# Patient Record
Sex: MALE | Race: BLACK OR AFRICAN AMERICAN | RURAL
[De-identification: names, ages, dates, MRNs, and addresses within clinical notes are randomized per-mention and may not be internally consistent; named-entity substitution may affect disease eponyms.]

---

## 2018-01-11 ENCOUNTER — OFFICE VISIT (OUTPATIENT)
Dept: INTERNAL MEDICINE CLINIC | Age: 23
End: 2018-01-11

## 2018-01-11 VITALS
WEIGHT: 152.8 LBS | TEMPERATURE: 97.3 F | DIASTOLIC BLOOD PRESSURE: 67 MMHG | BODY MASS INDEX: 23.16 KG/M2 | HEIGHT: 68 IN | RESPIRATION RATE: 18 BRPM | HEART RATE: 68 BPM | SYSTOLIC BLOOD PRESSURE: 126 MMHG | OXYGEN SATURATION: 100 %

## 2018-01-11 DIAGNOSIS — G44.52 NEW DAILY PERSISTENT HEADACHE: Primary | ICD-10-CM

## 2018-01-11 DIAGNOSIS — Z20.6 EXPOSURE TO HIV: ICD-10-CM

## 2018-01-11 DIAGNOSIS — Z76.89 ENCOUNTER TO ESTABLISH CARE: ICD-10-CM

## 2018-01-11 NOTE — PROGRESS NOTES
HISTORY OF PRESENT ILLNESS  Moisés Skinner is a 23 y.o. male. HPI  Chief Complaint   Patient presents with    New Patient     ESTABLISH CARE     States during 12/23 had high intake of alcohol. Since then has been having HA. Review of Systems   HENT: Positive for congestion and ear pain. Negative for sore throat. Pain left side  Feels has cold that started 3 weeks ago   Eyes: Negative. Negative for double vision. Respiratory: Negative for cough, shortness of breath and wheezing. Cardiovascular: Negative for chest pain and leg swelling. Gastrointestinal: Negative for abdominal pain, nausea and vomiting. Has BM every other day or 2 days   Genitourinary: Negative for dysuria and hematuria. Musculoskeletal: Negative for back pain and joint pain. Neurological: Positive for headaches. Negative for dizziness, tingling and tremors. States LEE develops every day after moving around. Has HA today 3/10  Patient call LEE an irritation that he describes as pressure, achy. States with exertion it can get to 4/10. Physical Exam   Constitutional: He is oriented to person, place, and time. He appears well-developed and well-nourished. Cardiovascular: Normal rate, regular rhythm, normal heart sounds and intact distal pulses. Exam reveals no gallop and no friction rub. No murmur heard. Pulmonary/Chest: Effort normal and breath sounds normal. No respiratory distress. He has no wheezes. He has no rales. Neurological: He is alert and oriented to person, place, and time. Skin: Skin is warm and dry. Nursing note and vitals reviewed. Plan of care and AVS reviewed with patient who verbalized understanding. ASSESSMENT and PLAN    ICD-10-CM ICD-9-CM    1. New daily persistent headache G44.52 339.42 CBC WITH AUTOMATED DIFF      METABOLIC PANEL, COMPREHENSIVE      TSH 3RD GENERATION   2.  Encounter to establish care Z76.89 V65.8 [place] HANDLG&/OR CONVEY OF SPEC FOR TR OFFICE TO LAB      [place] COLLECTION VENOUS BLOOD,VENIPUNCTURE   3. Exposure to HIV Z20.6 V01.79 HIV-1 RNA QL      [place] HANDLG&/OR CONVEY OF SPEC FOR TR OFFICE TO LAB      UT COLLECTION VENOUS BLOOD,VENIPUNCTURE   F/U physical exam and lab review.

## 2018-01-11 NOTE — PROGRESS NOTES
Chief Complaint   Patient presents with    New Patient     [address] Maintenance Due   Topic Date Due    HPV AGE 9Y-34Y (3 of 3 - Male 3 Dose Series) 11/06/2016    Influenza Age 5 to Adult  08/01/2017     Patient refuses flu vaccine.

## 2018-01-11 NOTE — MR AVS SNAPSHOT
Visit Information Date & Time Provider Department Dept. Phone Encounter #  
 1/11/2018 10:00 AM Ender Gaspar, 1 [facility] [phone] [id number] Follow-up Instructions Return in about 1 week (around 1/18/2018) for Physical exam. Upcoming Health Maintenance Date Due  
 HPV AGE 9Y-34Y (3 of 3 - Male 3 Dose Series) 11/6/2016 Influenza Age 5 to Adult 8/1/2017 DTaP/Tdap/Td series (8 - Td) 7/23/2025 Allergies as of 1/11/2018  Review Complete On: 1/11/2018 By: Ender Gaspar NP No Known Allergies Current Immunizations  Reviewed on 1/11/2018 Name Date DTAP Vaccine 2/19/2002, 2/15/1998, 1997, 1997, 1997 HIB Vaccine 2/19/1998, 1997, 1997, 1997 HPV (9-valent) 7/6/2016 HPV (Quad) 7/23/2015 Hep A Vaccine 2 Dose Schedule (Ped/Adol) 4/21/2014 Hepatitis A Vaccine 6/6/2012 Hepatitis B Vaccine 1/13/1998, 1997, 1997 IPV 2/19/2002, 1997, 1997, 1997 MMR Vaccine 2/19/2002, 2/19/1998 Meningococcal (MCV4P) Vaccine 7/23/2015 Meningococcal Vaccine 6/6/2012 TDAP Vaccine 9/2/2009 Tdap 7/23/2015 Varicella Virus Vaccine Live 6/6/2012, 8/14/2000 Reviewed by Ender Gaspar NP on 1/11/2018 at 10:13 AM  
You Were Diagnosed With   
  
 Codes Comments Encounter to establish care    -  Primary ICD-10-CM: Z76.89 
ICD-9-CM: V65.8 New daily persistent headache     ICD-10-CM: G44.52 
ICD-9-CM: 339.42 Exposure to HIV     ICD-10-CM: Z20.6 ICD-9-CM: V01.79 Vitals BP Pulse Temp Resp Height(growth percentile) 126/67 (BP 1 Location: Left arm, BP Patient Position: Sitting) 68 97.3 °F (36.3 °C) (Temporal) 18 5' 7.52\" (1.715 m) Weight(growth percentile) SpO2 BMI Smoking Status 152 lb 12.8 oz (69.3 kg) 100% 23.56 kg/m2 Former Smoker Vitals History BMI and BSA Data  Body Mass Index Body Surface Area  
 23.56 kg/m 2 1.82 m 2  
  
  
 Preferred Pharmacy Pharmacy Name Phone [facility] [address] [facility] [phone] Your Updated Medication List  
  
Notice  As of 1/11/2018 10:43 AM  
 You have not been prescribed any medications. We Performed the Following CBC WITH AUTOMATED DIFF [04412 CPT(R)] HIV-1 RNA QL [id number] CPT(R)] METABOLIC PANEL, COMPREHENSIVE [18100 CPT(R)] TSH 3RD GENERATION [92806 CPT(R)] Follow-up Instructions Return in about 1 week (around 1/18/2018) for Physical exam.  
  
  
Introducing [facility] & HEALTH SERVICES! Sherry Alberts introduces [employer] patient portal. Now you can access parts of your medical record, email your doctor's office, and request medication refills online. 1. In your internet browser, go to [email or web address] 2. Click on the First Time User? Click Here link in the Sign In box. You will see the New Member Sign Up page. 3. Enter your Sy[employer]ancia Access Code exactly as it appears below. You will not need to use this code after youve completed the sign-up process. If you do not sign up before the expiration date, you must request a new code. · [employer] Access Code: [email or web address] Expires: 4/11/2018  9:33 AM 
 
4. Enter the last four digits of your Social Security Number (xxxx) and Date of Birth (mm/dd/yyyy) as indicated and click Submit. You will be taken to the next sign-up page. 5. Create a Systancia ID. This will be your [employer] login ID and cannot be changed, so think of one that is secure and easy to remember. 6. Create a [employer]ancia password. You can change your password at any time. 7. Enter your Password Reset Question and Answer. This can be used at a later time if you forget your password. 8. Enter your e-mail address. You will receive e-mail notification when new information is available in [address] 9. Click Sign Up. You can now view and download portions of your medical record. 10. Click the Download Summary menu link to download a portable copy of your medical information. If you have questions, please visit the Frequently Asked Questions section of the Balakam website. Remember, Balakam is NOT to be used for urgent needs. For medical emergencies, dial 911. Now available from your iPhone and Android! Please provide this summary of care documentation to your next provider. Your primary care clinician is listed as Shannen Orosco. If you have any questions after today's visit, please call [phone].

## 2018-01-16 LAB
ALBUMIN SERPL-MCNC: 4.8 G/DL (ref 3.5–5.5)
ALBUMIN/GLOB SERPL: 1.5 {RATIO} (ref 1.2–2.2)
ALP SERPL-CCNC: 69 IU/L (ref 39–117)
ALT SERPL-CCNC: 8 IU/L (ref 0–44)
AST SERPL-CCNC: 21 IU/L (ref 0–40)
BASOPHILS # BLD AUTO: 0 X10E3/UL (ref 0–0.2)
BASOPHILS NFR BLD AUTO: 0 %
BILIRUB SERPL-MCNC: 1.8 MG/DL (ref 0–1.2)
BUN SERPL-MCNC: 17 MG/DL (ref 6–20)
BUN/CREAT SERPL: 18 (ref 9–20)
CALCIUM SERPL-MCNC: 9.6 MG/DL (ref 8.7–10.2)
CHLORIDE SERPL-SCNC: 99 MMOL/L (ref 96–106)
CO2 SERPL-SCNC: 20 MMOL/L (ref 18–29)
CREAT SERPL-MCNC: 0.97 MG/DL (ref 0.76–1.27)
EOSINOPHIL # BLD AUTO: 0.1 X10E3/UL (ref 0–0.4)
EOSINOPHIL NFR BLD AUTO: 2 %
ERYTHROCYTE [DISTWIDTH] IN BLOOD BY AUTOMATED COUNT: 13.3 % (ref 12.3–15.4)
GLOBULIN SER CALC-MCNC: 3.1 G/DL (ref 1.5–4.5)
GLUCOSE SERPL-MCNC: 76 MG/DL (ref 65–99)
HCT VFR BLD AUTO: 44 % (ref 37.5–51)
HGB BLD-MCNC: 14.2 G/DL (ref 13–17.7)
HIV1 RNA SERPL QL NAA+PROBE: NEGATIVE
IMM GRANULOCYTES # BLD: 0 X10E3/UL (ref 0–0.1)
IMM GRANULOCYTES NFR BLD: 0 %
LYMPHOCYTES # BLD AUTO: 1.3 X10E3/UL (ref 0.7–3.1)
LYMPHOCYTES NFR BLD AUTO: 35 %
MCH RBC QN AUTO: 29.8 PG (ref 26.6–33)
MCHC RBC AUTO-ENTMCNC: 32.3 G/DL (ref 31.5–35.7)
MCV RBC AUTO: 92 FL (ref 79–97)
MONOCYTES # BLD AUTO: 0.3 X10E3/UL (ref 0.1–0.9)
MONOCYTES NFR BLD AUTO: 7 %
NEUTROPHILS # BLD AUTO: 2 X10E3/UL (ref 1.4–7)
NEUTROPHILS NFR BLD AUTO: 56 %
PLATELET # BLD AUTO: 243 X10E3/UL (ref 150–379)
POTASSIUM SERPL-SCNC: 4.1 MMOL/L (ref 3.5–5.2)
PROT SERPL-MCNC: 7.9 G/DL (ref 6–8.5)
RBC # BLD AUTO: 4.76 X10E6/UL (ref 4.14–5.8)
SODIUM SERPL-SCNC: 140 MMOL/L (ref 134–144)
TSH SERPL DL<=0.005 MIU/L-ACNC: 1.39 UIU/ML (ref 0.45–4.5)
WBC # BLD AUTO: 3.6 X10E3/UL (ref 3.4–10.8)

## 2018-01-18 ENCOUNTER — OFFICE VISIT (OUTPATIENT)
Dept: INTERNAL MEDICINE CLINIC | Age: 23
End: 2018-01-18

## 2018-01-18 VITALS
WEIGHT: 162.4 LBS | RESPIRATION RATE: 18 BRPM | TEMPERATURE: 97.2 F | HEIGHT: 68 IN | BODY MASS INDEX: 24.61 KG/M2 | HEART RATE: 83 BPM | DIASTOLIC BLOOD PRESSURE: 63 MMHG | OXYGEN SATURATION: 100 % | SYSTOLIC BLOOD PRESSURE: 130 MMHG

## 2018-01-18 DIAGNOSIS — Z00.00 PHYSICAL EXAM: Primary | ICD-10-CM

## 2018-01-18 DIAGNOSIS — R17 INCREASED BILIRUBIN LEVEL: ICD-10-CM

## 2018-01-18 NOTE — PROGRESS NOTES
Chief Complaint   Patient presents with    Complete Physical     1. Have you been to the ER, urgent care clinic since your last visit? Hospitalized since your last visit? No    2. Have you seen or consulted any other health care providers outside of the [facility] since your last visit? Include any pap smears or colon screening. No     There are no preventive care reminders to display for this patient.

## 2018-01-18 NOTE — MR AVS SNAPSHOT
[address] [address] [facility] 
[phone] Patient: Adolfo Villeda MRN: [id number] :1995 Visit Information Date & Time Provider Department Dept. Phone Encounter #  
 2018 10:30 AM [facility] [id number] Follow-up Instructions Return if symptoms worsen or fail to improve. Upcoming Health Maintenance Date Due DTaP/Tdap/Td series (8 - Td) 2025 Allergies as of 2018  Review Complete On: 2018 By: Anish Frausto LPN No Known Allergies Current Immunizations  Reviewed on 2018 Name Date DTAP Vaccine 2002, 2/15/1998, 1997, 1997, 1997 HIB Vaccine 1998, 1997, 1997, 1997 HPV (9-valent) 2016 HPV (Quad) 2015 Hep A Vaccine 2 Dose Schedule (Ped/Adol) 2014 Hepatitis A Vaccine 2012 Hepatitis B Vaccine 1998, 1997, 1997 IPV 2002, 1997, 1997, 1997 MMR Vaccine 2002, 1998 Meningococcal (MCV4P) Vaccine 2015 Meningococcal Vaccine 2012 TDAP Vaccine 2009 Tdap 2015 Varicella Virus Vaccine Live 2012, 2000 Not reviewed this visit You Were Diagnosed With   
  
 Codes Comments Increased bilirubin level    -  Primary ICD-10-CM: E80.6 ICD-9-CM: 277.4 Physical exam     ICD-10-CM: Z00.00 ICD-9-CM: V70.9 Vitals BP Pulse Temp Resp Height(growth percentile) 130/63 (BP 1 Location: Right arm, BP Patient Position: Sitting) 83 97.2 °F (36.2 °C) (Temporal) 18 5' 7.52\" (1.715 m) Weight(growth percentile) SpO2 BMI Smoking Status 162 lb 6.4 oz (73.7 kg) 100% 25.05 kg/m2 Former Smoker Vitals History BMI and BSA Data Body Mass Index Body Surface Area 25.05 kg/m 2 1.87 m 2 Preferred Pharmacy Pharmacy Name Phone [facility], [address] [facility] [phone] Your Updated Medication List  
  
Notice  As of 1/18/2018 11:17 AM  
 You have not been prescribed any medications. We Performed the Following BILIRUBIN, TOTAL [id number] CPT(R)] Follow-up Instructions Return if symptoms worsen or fail to improve. Introducing [facility] & HEALTH SERVICES! [facility] introduces [employer] patient portal. Now you can access parts of your medical record, email your doctor's office, and request medication refills online. 1. In your internet browser, go to [email or web address] World 2. Click on the First Time User? Click Here link in the Sign In box. You will see the New Member Sign Up page. 3. Enter your Follica Access Code exactly as it appears below. You will not need to use this code after youve completed the sign-up process. If you do not sign up before the expiration date, you must request a new code. · [employer]a Access Code: [email or web address] Expires: 4/11/2018  9:33 AM 
 
4. Enter the last four digits of your Social Security Number (xxxx) and Date of Birth (mm/dd/yyyy) as indicated and click Submit. You will be taken to the next sign-up page. 5. Create a Follica ID. This will be your Follica login ID and cannot be changed, so think of one that is secure and easy to remember. 6. Create a Follica password. You can change your password at any time. 7. Enter your Password Reset Question and Answer. This can be used at a later time if you forget your password. 8. Enter your e-mail address. You will receive e-mail notification when new information is available in [address] 9. Click Sign Up. You can now view and download portions of your medical record. 10. Click the Download Summary menu link to download a portable copy of your medical information.  
 
If you have questions, please visit the Frequently Asked Questions section of the [employer]y. Remember, WowOwowhart is NOT to be used for urgent needs. For medical emergencies, dial 911. Now available from your iPhone and Android! Please provide this summary of care documentation to your next provider. Your primary care clinician is listed as Maxcine Ahumada. If you have any questions after today's visit, please call [phone].

## 2018-01-19 LAB — BILIRUB SERPL-MCNC: 1.1 MG/DL (ref 0–1.2)

## 2018-01-22 ENCOUNTER — TELEPHONE (OUTPATIENT)
Dept: INTERNAL MEDICINE CLINIC | Age: 23
End: 2018-01-22

## 2018-01-24 NOTE — PROGRESS NOTES
HISTORY OF PRESENT ILLNESS  Cong Antunez is a 26 y.o. male. HPI  Chief Complaint   Patient presents with    Complete Physical     States since increasing water intake BM and urination much better. States still having irritability at occipital and partial lobes. Lab Results   Component Value Date/Time    WBC 3.6 01/11/2018 10:52 AM    Hemoglobin (POC) 14.1 07/23/2015 11:33 AM    HGB 14.2 01/11/2018 10:52 AM    HCT 44.0 01/11/2018 10:52 AM    PLATELET 568 85/82/5492 10:52 AM       Lab Results   Component Value Date/Time    ALT (SGPT) 8 01/11/2018 10:52 AM    AST (SGOT) 21 01/11/2018 10:52 AM    Creatinine 0.97 01/11/2018 10:52 AM    BUN 17 01/11/2018 10:52 AM    Sodium 140 01/11/2018 10:52 AM    Potassium 4.1 01/11/2018 10:52 AM       Lab Results   Component Value Date/Time    Cholesterol, total 134 07/23/2015 10:59 AM    HDL Cholesterol 62 07/23/2015 10:59 AM    LDL, calculated 65 07/23/2015 10:59 AM    Triglyceride 35 07/23/2015 10:59 AM    TSH 1.390 01/11/2018 10:52 AM     Bilirubin 1.8 and will recheck today since hydration. Review of Systems   Constitutional: Negative for chills, fever and weight loss. HENT: Negative. Negative for congestion, ear pain and sore throat. Respiratory: Negative. Negative for cough, shortness of breath and wheezing. Cardiovascular: Negative. Negative for chest pain and leg swelling. Gastrointestinal: Negative for abdominal pain, diarrhea, nausea and vomiting. Genitourinary: Negative. Skin: Negative. Neurological: Positive for headaches. Negative for dizziness. Physical Exam   Constitutional: He is oriented to person, place, and time. He appears well-developed and well-nourished. No distress. HENT:   Head: Normocephalic and atraumatic. Eyes: Conjunctivae are normal.   Neck: Normal range of motion. Neck supple. No thyromegaly present. Cardiovascular: Normal rate, regular rhythm and intact distal pulses.   Exam reveals no gallop and no friction rub.    No murmur heard. Pulmonary/Chest: Effort normal and breath sounds normal. No respiratory distress. He has no wheezes. He has no rales. Abdominal: Soft. Bowel sounds are normal. He exhibits no distension and no mass. There is no tenderness. There is no rebound and no guarding. Musculoskeletal: Normal range of motion. Lymphadenopathy:     He has no cervical adenopathy. Neurological: He is alert and oriented to person, place, and time. Skin: Skin is warm and dry. No rash noted. He is not diaphoretic. No erythema. Positive for enlarged lymph nodes B femoral  Patient states these are not new but decreased since weight loss. Nursing note and vitals reviewed. Plan of care and AVS reviewed with patient who verbalized understanding. ASSESSMENT and PLAN    ICD-10-CM ICD-9-CM    1. Physical exam Z00.00 V70.9    2. Increased bilirubin level E80.6 277.4 BILIRUBIN, TOTAL      OR HANDLG&/OR CONVEY OF SPEC FOR TR OFFICE TO LAB      OR COLLECTION VENOUS BLOOD,VENIPUNCTURE   Will notify patient of lab results. Patient has decided to forego neurology referral or CT scan. F/U prn.

## 2018-03-28 ENCOUNTER — OFFICE VISIT (OUTPATIENT)
Dept: INTERNAL MEDICINE CLINIC | Age: 23
End: 2018-03-28

## 2018-03-28 VITALS
BODY MASS INDEX: 24.61 KG/M2 | HEIGHT: 68 IN | DIASTOLIC BLOOD PRESSURE: 67 MMHG | WEIGHT: 162.4 LBS | RESPIRATION RATE: 20 BRPM | HEART RATE: 67 BPM | OXYGEN SATURATION: 100 % | TEMPERATURE: 97.2 F | SYSTOLIC BLOOD PRESSURE: 126 MMHG

## 2018-03-28 DIAGNOSIS — J02.9 SORE THROAT: ICD-10-CM

## 2018-03-28 DIAGNOSIS — R09.82 POSTNASAL DRIP: ICD-10-CM

## 2018-03-28 DIAGNOSIS — G44.52 NEW DAILY PERSISTENT HEADACHE: Primary | ICD-10-CM

## 2018-03-28 LAB
S PYO AG THROAT QL: NEGATIVE
VALID INTERNAL CONTROL?: YES

## 2018-03-28 RX ORDER — LORATADINE 10 MG/1
10 TABLET ORAL DAILY
COMMUNITY
Start: 2018-03-28 | End: 2019-10-22 | Stop reason: ALTCHOICE

## 2018-03-28 NOTE — PROGRESS NOTES
Chief Complaint   Patient presents with    Facial Pain     HAS COMPLAINTS OF FLINCHING UNDER LEFT EYE, BUT DENIES VISION ISSUES AND NOT DAILY. 1. Have you been to the ER, urgent care clinic since your last visit? Hospitalized since your last visit? No    2. Have you seen or consulted any other health care providers outside of the [address] [place] [address] since your last visit? Include any pap smears or colon screening. No     There are no preventive care reminders to display for this patient.

## 2018-03-28 NOTE — MR AVS SNAPSHOT
Lavaun Jeans 
 
 
 [address] [place] [address]. .o. [place] [address] [facility] 
[phone] Patient: Sweta Forde MRN: [id number] :1995 Visit Information Date & Time Provider Department Dept. Phone Encounter #  
 3/28/2018  7:30 AM Emily Gifford NP [facility] [phone] [id number] Upcoming Health Maintenance Date Due DTaP/Tdap/Td series (8 - Td) 2025 Allergies as of 3/28/2018  Review Complete On: 3/28/2018 By: Emliy Gifford NP No Known Allergies Current Immunizations  Reviewed on 2018 Name Date DTAP Vaccine 2002, 2/15/1998, 1997, 1997, 1997 HIB Vaccine 1998, 1997, 1997, 1997 HPV (9-valent) 2016 HPV (Quad) 2015 Hep A Vaccine 2 Dose Schedule (Ped/Adol) 2014 Hepatitis A Vaccine 2012 Hepatitis B Vaccine 1998, 1997, 1997 IPV 2002, 1997, 1997, 1997 MMR Vaccine 2002, 1998 Meningococcal (MCV4P) Vaccine 2015 Meningococcal Vaccine 2012 TDAP Vaccine 2009 Tdap 2015 Varicella Virus Vaccine Live 2012, 2000 Not reviewed this visit You Were Diagnosed With   
  
 Codes Comments New daily persistent headache    -  Primary ICD-10-CM: G44.52 
ICD-9-CM: 339.42 Postnasal drip     ICD-10-CM: R09.82 ICD-9-CM: 784.91 Sore throat     ICD-10-CM: J02.9 ICD-9-CM: 163 Vitals BP Pulse Temp Resp Height(growth percentile) 126/67 (BP 1 Location: Left arm, BP Patient Position: Sitting) 67 97.2 °F (36.2 °C) (Temporal) 20 5' 7.52\" (1.715 m) Weight(growth percentile) SpO2 BMI Smoking Status 162 lb 6.4 oz (73.7 kg) 100% 25.05 kg/m2 Former Smoker Vitals History BMI and BSA Data Body Mass Index Body Surface Area 25.05 kg/m 2 1.87 m 2 Preferred Pharmacy Pharmacy Name Phone [facility], [address] [facility] [phone] Your Updated Medication List  
  
   
This list is accurate as of 3/28/18  8:09 AM.  Always use your most recent med list.  
  
  
  
  
 loratadine 10 mg tablet Commonly known as:  Virgilio Friday Take 1 Tab by mouth daily. We Performed the Following AMB POC RAPID STREP A [89706 CPT(R)] To-Do List   
 03/28/2018 Imaging:  CT HEAD WO CONT Introducing [facility] & HEALTH SERVICES! Venu Hall introduces [employer] patient portal. Now you can access parts of your medical record, email your doctor's office, and request medication refills online. 1. In your internet browser, go to [email or web address] 2. Click on the First Time User? Click Here link in the Sign In box. You will see the New Member Sign Up page. 3. Enter your G[employer] Access Code exactly as it appears below. You will not need to use this code after youve completed the sign-up process. If you do not sign up before the expiration date, you must request a new code. · [employer] Access Code: [email or web address] Expires: 4/11/2018 10:33 AM 
 
4. Enter the last four digits of your Social Security Number (xxxx) and Date of Birth (mm/dd/yyyy) as indicated and click Submit. You will be taken to the next sign-up page. 5. Create a G[employer] Tech ID. This will be your G[employer] login ID and cannot be changed, so think of one that is secure and easy to remember. 6. Create a G[employer] password. You can change your password at any time. 7. Enter your Password Reset Question and Answer. This can be used at a later time if you forget your password. 8. Enter your e-mail address. You will receive e-mail notification when new information is available in [address] 9. Click Sign Up. You can now view and download portions of your medical record. 10. Click the Download Summary menu link to download a portable copy of your medical information. If you have questions, please visit the Frequently Asked Questions section of the Sustainable Real Estate Solutionst website. Remember, WebVet is NOT to be used for urgent needs. For medical emergencies, dial 911. Now available from your iPhone and Android! Please provide this summary of care documentation to your next provider. Your primary care clinician is listed as Shannen Orosco. If you have any questions after today's visit, please call [phone].

## 2018-03-28 NOTE — PROGRESS NOTES
HISTORY OF PRESENT ILLNESS  Sweta Forde is a 26 y.o. male. HPI  Chief Complaint   Patient presents with    Facial Pain     HAS COMPLAINTS OF FLINCHING UNDER LEFT EYE, BUT DENIES VISION ISSUES AND NOT DAILY.  has been having SX x 2 weeks.  took tylenol to help with SX. States that these SX are a continuation of HA and irritation that occurred in January with first appointment after intoxication of alcohol.  reds a lot on computer about his SX and worrys. Labs reviewed with patient and reassurance given. Patient states is interested in having CT scan. Rapid Strep negative    Review of Systems   HENT: Positive for congestion, ear pain, sinus pain and sore throat. Eyes: Negative. Respiratory: Negative. Cardiovascular: Negative. Gastrointestinal: Negative. Genitourinary: Negative. Musculoskeletal:        C/O flinching under right eye   Psychiatric/Behavioral:        Admits to worrying a lot about self and health. Physical Exam   Constitutional: He is oriented to person, place, and time. He appears well-developed and well-nourished. No distress. HENT:   Head: Normocephalic and atraumatic. Positive for postnasal drip. Eyes: Conjunctivae are normal.   Cardiovascular: Normal rate, regular rhythm, normal heart sounds and intact distal pulses. Exam reveals no gallop and no friction rub. No murmur heard. Pulmonary/Chest: Effort normal and breath sounds normal. No respiratory distress. He has no wheezes. He has no rales. Abdominal: Soft. Bowel sounds are normal. He exhibits no distension. Musculoskeletal: Normal range of motion. No flinchingunder eye noted   Neurological: He is alert and oriented to person, place, and time. Skin: Skin is warm and dry. He is not diaphoretic. Nursing note and vitals reviewed. Plan of care and AVS reviewed with patient who verbalized understanding.     ASSESSMENT and PLAN    ICD-10-CM ICD-9-CM    1. New daily persistent headache G44.52 339.42 CT HEAD WO CONT   2. Postnasal drip R09.82 784.91 loratadine (CLARITIN) 10 mg tablet   3. Sore throat J02.9 462 AMB POC RAPID STREP A   Started loratadine  [facility] to call patient to schedule appointment. F/U prn.

## 2019-01-02 ENCOUNTER — OFFICE VISIT (OUTPATIENT)
Dept: INTERNAL MEDICINE CLINIC | Age: 24
End: 2019-01-02

## 2019-01-02 VITALS
BODY MASS INDEX: 24.4 KG/M2 | WEIGHT: 161 LBS | DIASTOLIC BLOOD PRESSURE: 74 MMHG | TEMPERATURE: 96.8 F | OXYGEN SATURATION: 100 % | HEART RATE: 62 BPM | SYSTOLIC BLOOD PRESSURE: 132 MMHG | HEIGHT: 68 IN | RESPIRATION RATE: 16 BRPM

## 2019-01-02 DIAGNOSIS — Z02.89 ENCOUNTER FOR PHYSICAL EXAMINATION RELATED TO EMPLOYMENT: Primary | ICD-10-CM

## 2019-01-02 LAB
BILIRUB UR QL STRIP: NEGATIVE
GLUCOSE UR-MCNC: NEGATIVE MG/DL
KETONES P FAST UR STRIP-MCNC: NEGATIVE MG/DL
PH UR STRIP: 5.5 [PH] (ref 4.6–8)
PROT UR QL STRIP: NEGATIVE
SP GR UR STRIP: 1.01 (ref 1–1.03)
UA UROBILINOGEN AMB POC: NORMAL (ref 0.2–1)
URINALYSIS CLARITY POC: CLEAR
URINALYSIS COLOR POC: YELLOW
URINE BLOOD POC: NEGATIVE
URINE LEUKOCYTES POC: NEGATIVE
URINE NITRITES POC: NEGATIVE

## 2019-01-02 NOTE — PROGRESS NOTES
DOT PHYSICAL    Devscott Locks 26 y.o. presents for a DOT physical.  He has the following concerns: none    No dizziness, syncope or fainting, exertional chest pain, excessive shortness of breath, focal weakness, abdominal pain, severe depressed mood, thoughts of suicide, recreational drug abuse, excessive alcohol usage. No excessive sleepiness in the day time falling asleep at the wheel or any motor vehicle accidents. No diabetes. No insulin or narcotic agents. Patient Active Problem List   Diagnosis Code    Right shoulder injury S49. 91XA    New daily persistent headache G44.52     Past Medical History:   Diagnosis Date    Right shoulder injury 2014    Seen at [address] [place] [address] 2014     History reviewed. No pertinent surgical history. Social History     Socioeconomic History    Marital status: SINGLE     Spouse name: Not on file    Number of children: Not on file    Years of education: Not on file    Highest education level: Not on file   Social Needs    Financial resource strain: Not on file    Food insecurity - worry: Not on file    Food insecurity - inability: Not on file    Transportation needs - medical: Not on file   [place] needs - non-medical: Not on file   Occupational History    Not on file   Tobacco Use    Smoking status: Former Smoker     Last attempt to quit: 2017     Years since quittin.0    Smokeless tobacco: Never Used   Substance and Sexual Activity    Alcohol use: Yes     Alcohol/week: 0.0 oz     Comment: Occasional    Drug use: No    Sexual activity: Yes     Partners: Female     Birth control/protection: Condom   Other Topics Concern    Not on file   Social History Narrative    Not on file       Current Outpatient Medications   Medication Sig Dispense Refill    loratadine (CLARITIN) 10 mg tablet Take 1 Tab by mouth daily.            Results for orders placed or performed in visit on 19   [facility] POC URINALYSIS DIP STICK AUTO W/O MICRO   Result Value Ref Range    Color (UA POC) Yellow     Clarity (UA POC) Clear     Glucose (UA POC) Negative Negative    Bilirubin (UA POC) Negative Negative    Ketones (UA POC) Negative Negative    Specific gravity (UA POC) 1.015 1.001 - 1.035    Blood (UA POC) Negative Negative    pH (UA POC) 5.5 4.6 - 8.0    Protein (UA POC) Negative Negative    Urobilinogen (UA POC) 0.2 mg/dL 0.2 - 1    Nitrites (UA POC) Negative Negative    Leukocyte esterase (UA POC) Negative Negative         ROS: 12 point system revived,please see HPI for pertinent positives. OBJECTIVE:     Visit Vitals  /74 (BP 1 Location: Left arm, BP Patient Position: Sitting)   Pulse 62   Temp 96.8 °F (36 °C) (Temporal)   Resp 16   Ht 5' 7.52\" (1.715 m)   Wt 161 lb (73 kg)   SpO2 100%   BMI 24.83 kg/m²       Vision meet standards and hearing test good. No exam data present       Physical Examination:    General appearance - alert, well appearing, and in no distress. HEENT  PERRLA, EOMI, Sclera clear, anicteric, Oropharynx clear, no lesions. Chest - RRR S1, S2 heard, no peripheral edema. Lungs- Clear to auscultation, no wheezes, rales or rhonchi, has symmetric air entry    Neck- Supple, No adenopathy. Neuro- CN 2 to 11 grossly normal, No neuro deficits              DTR 2/4, strength on upper/lower ext 5/5 manny    Abdomen/groin- ND,NT, No hernia    ROM: Good ROM of upper and lower extremities. Feet normal      Psy- Mood and Affect WNL      ASSESSMENT/PLAN       ICD-10-CM ICD-9-CM    1. Encounter for physical examination related to employment Z02.89 V70.5 AMB POC URINALYSIS DIP STICK AUTO W/O MICRO         Healthy appearing person. Advised routine care with PCP. 2 yr DOT card with no restrictions given. DOT papers kept in file and [facility] web site updated. I have discussed the diagnosis with the patient and the intended plan as seen in the above orders.   The patient has received an after-visit summary and questions were answered concerning future plans. I have discussed medication side effects and warnings with the patient as well. Pt verbalizes understanding.

## 2019-01-02 NOTE — PROGRESS NOTES
Chief Complaint   Patient presents with    Physical     DOT     I have reviewed the patient's medical history in detail and updated the computerized patient record. Health Maintenance reviewed. 1. Have you been to the ER, urgent care clinic since your last visit? Hospitalized since your last visit?no    2. Have you seen or consulted any other health care providers outside of the [address] [place] [address] [place] since your last visit? Include any pap smears or colon screening. No      Encouraged pt to discuss pt's wishes with spouse/partner/family and bring them in the next appt to follow thru with the Advanced Directive    Fall Risk Assessment, last 12 mths 1/2/2019   Able to walk? Yes   Fall in past 12 months? No       PHQ over the last two weeks 1/2/2019   Little interest or pleasure in doing things Not at all   Feeling down, depressed, irritable, or hopeless Not at all   Total Score PHQ 2 0       Abuse Screening Questionnaire 1/2/2019   Do you ever feel afraid of your partner? N   Are you in a relationship with someone who physically or mentally threatens you? N   Is it safe for you to go home?  Y       ADL Assessment 1/2/2019   Feeding yourself No Help Needed   Getting from bed to chair No Help Needed   Getting dressed No Help Needed   Bathing or showering No Help Needed   Walk across the room (includes cane/walker) No Help Needed   Using the telphone No Help Needed   Taking your medications No Help Needed   Preparing meals No Help Needed   Managing money (expenses/bills) No Help Needed   Moderately strenuous housework (laundry) No Help Needed   Shopping for personal items (toiletries/medicines) No Help Needed   Shopping for groceries No Help Needed   Driving No Help Needed   Climbing a flight of stairs No Help Needed   Getting to places beyond walking distances No Help Needed

## 2019-10-22 ENCOUNTER — OFFICE VISIT (OUTPATIENT)
Dept: INTERNAL MEDICINE CLINIC | Age: 24
End: 2019-10-22

## 2019-10-22 VITALS
HEIGHT: 68 IN | TEMPERATURE: 98.6 F | BODY MASS INDEX: 24.28 KG/M2 | DIASTOLIC BLOOD PRESSURE: 74 MMHG | SYSTOLIC BLOOD PRESSURE: 146 MMHG | HEART RATE: 108 BPM | OXYGEN SATURATION: 96 % | WEIGHT: 160.2 LBS | RESPIRATION RATE: 18 BRPM

## 2019-10-22 DIAGNOSIS — S91.332A PUNCTURE WOUND OF LEFT FOOT, INITIAL ENCOUNTER: Primary | ICD-10-CM

## 2019-10-22 DIAGNOSIS — S81.031A PUNCTURE WOUND OF RIGHT KNEE, INITIAL ENCOUNTER: ICD-10-CM

## 2019-10-22 RX ORDER — CETIRIZINE HCL 10 MG
10 TABLET ORAL DAILY
COMMUNITY
Start: 2018-08-27 | End: 2019-10-22 | Stop reason: ALTCHOICE

## 2019-10-22 RX ORDER — CEPHALEXIN 500 MG/1
500 CAPSULE ORAL 3 TIMES DAILY
Qty: 21 CAP | Refills: 0 | Status: SHIPPED | OUTPATIENT
Start: 2019-10-22 | End: 2019-10-29

## 2019-10-22 RX ORDER — IBUPROFEN 800 MG/1
800 TABLET ORAL
Qty: 20 TAB | Refills: 0 | Status: SHIPPED | OUTPATIENT
Start: 2019-10-22 | End: 2019-10-27

## 2019-10-22 NOTE — PROGRESS NOTES
Subjective:      Chief Complaint   Patient presents with    Knee Injury     [facility] Injury     [address]     He is a 27y.o. year old male who presents for evaluation. Works for [employer]. 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 He later stepped on a beatriz nail with his left foot 30 min later. Last tetanus shot was in 2015. Patient cleaned the site and stopped the bleeding with guaze. Then applied neosporin to his right thigh/ knee area and applied a band aid. Denied any fever or chills, cp, sob, weakness in leg or foot. Complained of soreness at both sites. Reviewed PmHx, RxHx, FmHx, SocHx, AllgHx and updated in chart. Review of Systems   History obtained from the patient  General ROS: negative  Allergy and Immunology ROS: negative  Respiratory ROS: no cough, shortness of breath, or wheezing  Cardiovascular ROS: no chest pain or dyspnea on exertion  Musculoskeletal ROS: negative  positive for - pain in left  foot - right thigh  Neurological ROS: no TIA or stroke symptoms  Dermatological ROS: positive for pain right thigh and left foot.      Objective:     Vitals:    10/22/19 1643   BP: 146/74   Pulse: (!) 108   Resp: 18   Temp: 98.6 °F (37 °C)   TempSrc: Oral   SpO2: 96%   Weight: 160 lb 3.2 oz (72.7 kg)   Height: 5' 7.52\" (1.715 m)     Physical Examination: General appearance - alert, well appearing, and in no distress, oriented to person, place, and time and well hydrated  Mental status - alert, oriented to person, place, and time, normal mood, behavior, speech, dress, motor activity, and thought processes  Chest - clear to auscultation, no wheezes, rales or rhonchi, symmetric air entry  Heart - normal rate, regular rhythm, normal S1, S2, no murmurs, rubs, clicks or gallops, normal rate and regular rhythm, S1 and S2 normal  Neurological - alert, oriented, normal speech, no focal findings or movement disorder noted  Musculoskeletal - no joint tenderness, deformity or swelling, abnormal exam of right leg- right thigh puncture wound, abnormal exam of left foot- puncture wound mid foot  Extremities - peripheral pulses normal, no pedal edema, no clubbing or cyanosis  Skin - puncture wound 0.25 cm dorsal left mid foot- erythema noted but no foreign body palpated. Right inner knee edematous and puncture wound/ bleeding noted. Assessment/ Plan:    consulted and examined pt. ICD-10-CM ICD-9-CM    1. Puncture wound of left foot, initial encounter S91.332A 892.0 cephALEXin (KEFLEX) 500 mg capsule      XR FOOT LT MIN 3 V      ibuprofen (MOTRIN) 800 mg tablet   2. Puncture wound of right knee, initial encounter S81.031A 891.0 cephALEXin (KEFLEX) 500 mg capsule      XR KNEE RT MAX 2 VWS      ibuprofen (MOTRIN) 800 mg tablet     1. Puncture wound of left foot, initial encounter  XRAY and antibiotic ordered. Motrin for pain. - cephALEXin (KEFLEX) 500 mg capsule; Take 1 Cap by mouth three (3) times daily for 7 days. Dispense: 21 Cap; Refill: 0  - XR FOOT LT MIN 3 V; Future  - ibuprofen (MOTRIN) 800 mg tablet; Take 1 Tab by mouth every six (6) hours as needed for Pain for up to 5 days. Take with food. Indications: pain  Dispense: 20 Tab; Refill: 0    2. Puncture wound of right knee, initial encounter  Take as directed. - cephALEXin (KEFLEX) 500 mg capsule; Take 1 Cap by mouth three (3) times daily for 7 days. Dispense: 21 Cap; Refill: 0  - XR KNEE RT MAX 2 VWS; Future  - ibuprofen (MOTRIN) 800 mg tablet; Take 1 Tab by mouth every six (6) hours as needed for Pain for up to 5 days. Indications: pain  Dispense: 20 Tab; Refill: 0    I have discussed the diagnosis with the patient and the intended plan as seen in the above orders. The patient has received an after-visit summary and questions were answered concerning future plans.      Medication Side Effects and Warnings were discussed with patient: yes  Patient Labs were reviewed ( 2018):  yes  Patient Past Records were reviewed:  yes    Note given and stated he should not apply a lot of pressure to his right knee or left foot due to an injury on his job. Can return to full duty on October 28th, 2019. Has [employer] this upcoming weekend. If s/s worsen go to the ER. Follow-up and Dispositions    · Return in about 2 weeks (around 11/5/2019), or if symptoms worsen or fail to improve, for follow up.        Taylor Stafford NP

## 2019-10-22 NOTE — LETTER
NOTIFICATION OF RETURN TO WORK 
 
10/22/2019 5:44 PM 
 
Mr. Ashwin Doss [address] [address] Angi Smith To Whom It May Concern: 
 
Ashwin Doss was under the care of [address]. He will not be able to apply pressure to rt knee or left foot due to an injury on job and to prevent infection. Mr. Kath Johnson can return to regular duties on October 28, 2019 If there are questions or concerns please have the patient contact our office. Sincerely, Kylah Reddy NP

## 2019-10-22 NOTE — LETTER
NOTIFICATION OF RETURN TO WORK  
 
10/22/2019 5:50 PM 
 
Mr. Renzo French [address] [address] Hari Gonzales To Whom It May Concern: 
 
Renzo French was under the care of [address]. He should not apply a lot of pressure to his right knee or left foot due to an injury on his job. This will also help prevent infection. Mr. Bouchra Jones can return to regular duties on October 28, 2019. If there are questions or concerns please have the patient contact our office. Sincerely, Desean Jeronimo NP

## 2019-10-22 NOTE — PROGRESS NOTES
1. Have you been to the ER, urgent care clinic since your last visit? Hospitalized since your last visit? No    2. Have you seen or consulted any other health care providers outside of the [address] [place] [address] since your last visit? Include any pap smears or colon screening.  No    Health Maintenance Due   Topic Date Due    Influenza Age 5 to Adult  08/01/2019

## 2019-10-22 NOTE — PATIENT INSTRUCTIONS
Puncture Wounds: Care Instructions  Your Care Instructions    A puncture wound can happen anywhere on your body. These wounds tend to be narrower and deeper than cuts. A puncture wound is usually left open instead of being closed. This is because a puncture wound can be easily infected, and closing it can make infection even more likely. You will probably have a bandage over the wound. The doctor has checked you carefully, but problems can develop later. If you notice any problems or new symptoms, get medical treatment right away. Follow-up care is a key part of your treatment and safety. Be sure to make and go to all appointments, and call your doctor if you are having problems. It's also a good idea to know your test results and keep a list of the medicines you take. How can you care for yourself at home? · Keep the wound dry for the first 24 to 48 hours. After this, you can shower if your doctor okays it. Pat the wound dry. · Don't soak the wound, such as in a bathtub. Your doctor will tell you when it's safe to get the wound wet. · If your doctor told you how to care for your wound, follow your doctor's instructions. If you did not get instructions, follow this general advice:  ? After the first 24 to 48 hours, wash the wound with clean water 2 times a day. Don't use hydrogen peroxide or alcohol, which can slow healing. ? You may cover the wound with a thin layer of petroleum jelly, such as Vaseline, and a nonstick bandage. ? Apply more petroleum jelly and replace the bandage as needed. · Prop up the sore area on pillows anytime you sit or lie down during the next 3 days. Try to keep it above the level of your heart. This helps reduce swelling. · Avoid any activity that could cause your wound to get worse. · Be safe with medicines. Read and follow all instructions on the label. ? If the doctor gave you a prescription medicine for pain, take it as prescribed.   ? If you are not taking a prescription pain medicine, ask your doctor if you can take an over-the-counter medicine. · If your doctor prescribed antibiotics, take them as directed. Do not stop taking them just because you feel better. You need to take the full course of antibiotics. When should you call for help? Call your doctor now or seek immediate medical care if:    · You have new pain, or your pain gets worse.     · The wound starts to bleed, and blood soaks through the bandage. Oozing small amounts of blood is normal.     · The skin near the wound is cold or pale or changes color.     · You have tingling, weakness, or numbness near the wound.     · You have trouble moving the area near the wound.     · You have symptoms of infection, such as:  ? Increased pain, swelling, warmth, or redness around the wound. ? Red streaks leading from the wound. ? Pus draining from the wound. ? A fever.    Watch closely for changes in your health, and be sure to contact your doctor if:    · The wound is not closing (getting smaller).     · You do not get better as expected. Where can you learn more? Go to http://yin-brando.info/. Enter L003 in the search box to learn more about \"Puncture Wounds: Care Instructions. \"  Current as of: June 26, 2019  Content Version: 12.2  © 4951-6382 [employer]. Care instructions adapted under license by [employer] (which disclaims liability or warranty for this information). If you have questions about a medical condition or this instruction, always ask your healthcare professional. [place] [address] any warranty or liability for your use of this information. Wound Check: Care Instructions  Your Care Instructions  People have wounds that need care for many reasons. You may have a cut that needs care after surgery. You may have a cut or puncture wound from an accident. Or you may have a wound because of a condition like diabetes.   Whatever the cause of your wound, there are things you can do to care for it at home. Your doctor may also want you to come back for a wound check. The wound check lets the doctor know how your wound is healing and if you need more treatment. Follow-up care is a key part of your treatment and safety. Be sure to make and go to all appointments, and call your doctor if you are having problems. It's also a good idea to know your test results and keep a list of the medicines you take. How can you care for yourself at home? · If your doctor told you how to care for your wound, follow your doctor's instructions. If you did not get instructions, follow this general advice:  ? You may cover the wound with a thin layer of petroleum jelly, such as Vaseline, and a nonstick bandage. ? Apply more petroleum jelly and replace the bandage as needed. · Keep the wound dry for the first 24 to 48 hours. After this, you can shower if your doctor okays it. Pat the wound dry. · Be safe with medicines. Read and follow all instructions on the label. ? If the doctor gave you a prescription medicine for pain, take it as prescribed. ? If you are not taking a prescription pain medicine, ask your doctor if you can take an over-the-counter medicine. · If your doctor prescribed antibiotics, take them as directed. Do not stop taking them just because you feel better. You need to take the full course of antibiotics. · If you have stitches, do not remove them on your own. Your doctor will tell you when to come back to have them removed. · If you have Steri-Strips, leave them on until they fall off. · If possible, prop up the injured area on a pillow anytime you sit or lie down during the next 3 days. Try to keep it above the level of your heart. This will help reduce swelling. When should you call for help?   Call your doctor now or seek immediate medical care if:    · You have new pain, or the pain gets worse.     · The skin near the wound is cold or pale or changes color.     · You have tingling, weakness, or numbness near the wound.     · The wound starts to bleed, and blood soaks through the bandage. Oozing small amounts of blood is normal.     · You have symptoms of infection, such as:  ? Increased pain, swelling, warmth, or redness. ? Red streaks leading from the wound. ? Pus draining from the wound. ? A fever.    Watch closely for changes in your health, and be sure to contact your doctor if:    · You do not get better as expected. Where can you learn more? Go to http://yin-brando.info/. Enter P342 in the search box to learn more about \"Wound Check: Care Instructions. \"  Current as of: June 26, 2019  Content Version: 12.2  © 5810-2043 [employer]. Care instructions adapted under license by [employer] (which disclaims liability or warranty for this information). If you have questions about a medical condition or this instruction, always ask your healthcare professional. [place] [address] any warranty or liability for your use of this information. Ibuprofen (By mouth)   Ibuprofen (eye-bue-PROE-fen)  Treats pain and fever. This medicine is an NSAID. Brand Name(s): Advil, Advil Children's, Advil Liqui-Gels, Advil Migraine, All-Purpose First Aid Kit, Children's Ibuprofen, Children's Motrin, Comfort Pac, Concentrated Motrin Infants' Drops, Equate Ibuprofen Julius Strength, Genpril, Good [facility]ighbor Ibuprofen Infants', [facility] Children's Ibuprofen, [facility] Ibuprofen, [facility] Ibuprofen Julius Strength   There may be other brand names for this medicine. When This Medicine Should Not Be Used: This medicine is not right for everyone. Do not use if you had an allergic reaction (including asthma) to ibuprofen, aspirin, or another NSAID, or right before or after heart surgery.   How to Use This Medicine:   Capsule, Liquid Filled Capsule, Suspension, Tablet, Chewable Tablet  · Your doctor will tell you how much medicine to use. Do not use more than directed. · Prescription ibuprofen should come with a Medication Guide. Ask your pharmacist for the Medication Guide if you do not have one. · Follow the instructions on the medicine label if you are using this medicine without a prescription. · Take this medicine with food or milk if it upsets your stomach. · Oral liquid: Shake well just before using. Measure with a marked measuring spoon, oral syringe, or medicine cup. · Chewable tablet: Chew completely before you swallow it. Then drink some water to make sure you swallow all of the medicine. · For Children: Ask your pharmacist if you are not sure how much medicine to give a child. The dose is usually based on weight, not age. Never give more medicine than directed. · For Adults: Do not take more than 6 pills in 1 day (24 hours) unless your doctor tells you to. · Missed dose: If you take this medicine on a regular basis and miss a dose, take it as soon as you can. If it is almost time for your next dose, wait until then to use the medicine and skip the missed dose. Do not use extra medicine to make up for a missed dose. · Store the medicine in a closed container at room temperature, away from heat, moisture, and direct light. Do not freeze the oral liquid. Drugs and Foods to Avoid:   Ask your doctor or pharmacist before using any other medicine, including over-the-counter medicines, vitamins, and herbal products. · Some foods and medicine can affect how ibuprofen works. Tell your doctor if you are also using lithium, methotrexate, a blood thinner (such as warfarin), a steroid medicine (such as hydrocortisone, prednisolone, prednisone), a diuretic (water pill), or an ACE inhibitor blood pressure medicine. · Do not use any other NSAID medicine unless your doctor says it is okay. Some other NSAIDs are aspirin, diclofenac, naproxen, or celecoxib.   · Do not drink alcohol while you are using this medicine. Warnings While Using This Medicine:   · Tell your doctor if you are pregnant or breastfeeding. Do not use this medicine during the later part of pregnancy. · Tell your doctor if you have kidney disease, liver disease, asthma, lupus or a similar connective tissue disease, or a history of ulcers or other digestion problems. Tell your doctor if you smoke or have heart or blood circulation problems, including high blood pressure, heart failure (CHF), or bleeding problems. · This medicine may cause the following problems:  ¨ Bleeding and ulcers in the stomach or intestines  ¨ Higher risk of heart attack or stroke  ¨ Liver damage  ¨ Kidney damage  ¨ Vision problems  · Call your doctor if symptoms get worse, pain lasts more than 10 days, or fever lasts more than 3 days. · This medicine might contain sugar or phenylalanine (aspartame). · Tell any doctor or dentist who treats you that you are using this medicine. · Keep all medicine out of the reach of children. Never share your medicine with anyone.   Possible Side Effects While Using This Medicine:   Call your doctor right away if you notice any of these side effects:  · Allergic reaction: Itching or hives, swelling in your face or hands, swelling or tingling in your mouth or throat, chest tightness, trouble breathing  · Blistering, peeling, or red skin rash  · Change in how much or how often you urinate  · Chest pain that may spread to your arms, jaw, back, or neck, trouble breathing, nausea, unusual sweating, faintness  · Chest pain, trouble breathing, weakness on one side of your body, severe headache, trouble seeing or talking, pain in your lower leg  · Dark urine or pale stools, nausea, vomiting, loss of appetite, stomach pain, yellow skin or eyes  · Fever, neck pain, stiff neck  · Severe stomach pain, vomiting blood, bloody or black, tarry stools  · Swelling in your hands, ankles, or feet, rapid weight gain  · Trouble seeing, blind spots, change in how you see colors  · Unusual bleeding, bruising, or weakness  If you notice these less serious side effects, talk with your doctor:   · Constipation, diarrhea, gas, mild upset stomach  · Dizziness, headache, ringing in the ears  If you notice other side effects that you think are caused by this medicine, tell your doctor. Call your doctor for medical advice about side effects. You may report side effects to FDA at 9-353-GCT-6199  © 2017 [facility] Information is for End User's use only and may not be sold, redistributed or otherwise used for commercial purposes. The above information is an  only. It is not intended as medical advice for individual conditions or treatments. Talk to your doctor, nurse or pharmacist before following any medical regimen to see if it is safe and effective for you. INSTRUCTIONS:  Your tetanus vaccine was given in 2015- Up to date. Get [facility]YS done tomorrow at [facility]. Keep wounds clean and dry. Wash with soap and water when cleansing. Apply bandaids as needed. Take Cephalexin 500mg 3 x day for 7 days to prevent infection  Take Ibuprofen 800mg every 6 hrs as needed for pain. Take with food.

## 2019-10-25 ENCOUNTER — TELEPHONE (OUTPATIENT)
Dept: INTERNAL MEDICINE CLINIC | Age: 24
End: 2019-10-25

## 2019-10-25 NOTE — TELEPHONE ENCOUNTER
Called pt and notified him that his XRAYS done on 10/23 were normal. He said the Ibuprofen 800mg is helping his discomfort. Advised him to continue to take it with food as needed/ directed.

## 2020-12-15 ENCOUNTER — OFFICE VISIT (OUTPATIENT)
Dept: INTERNAL MEDICINE CLINIC | Age: 25
End: 2020-12-15
Payer: MEDICAID

## 2020-12-15 VITALS
HEIGHT: 68 IN | RESPIRATION RATE: 16 BRPM | OXYGEN SATURATION: 99 % | TEMPERATURE: 97.6 F | BODY MASS INDEX: 24.25 KG/M2 | SYSTOLIC BLOOD PRESSURE: 135 MMHG | HEART RATE: 68 BPM | WEIGHT: 160 LBS | DIASTOLIC BLOOD PRESSURE: 80 MMHG

## 2020-12-15 DIAGNOSIS — Z02.89 ENCOUNTER FOR OCCUPATIONAL PHYSICAL EXAMINATION: Primary | ICD-10-CM

## 2020-12-15 LAB
BILIRUB UR QL STRIP: NEGATIVE
GLUCOSE UR-MCNC: NEGATIVE MG/DL
KETONES P FAST UR STRIP-MCNC: NEGATIVE MG/DL
PH UR STRIP: 6.5 [PH] (ref 4.6–8)
PROT UR QL STRIP: NEGATIVE
SP GR UR STRIP: 1.03 (ref 1–1.03)
UA UROBILINOGEN AMB POC: NORMAL (ref 0.2–1)
URINALYSIS CLARITY POC: CLEAR
URINALYSIS COLOR POC: YELLOW
URINE BLOOD POC: NORMAL
URINE LEUKOCYTES POC: NEGATIVE
URINE NITRITES POC: NEGATIVE

## 2020-12-15 PROCEDURE — 81003 URINALYSIS AUTO W/O SCOPE: CPT | Performed by: FAMILY MEDICINE

## 2020-12-15 PROCEDURE — 99213 OFFICE O/P EST LOW 20 MIN: CPT | Performed by: FAMILY MEDICINE

## 2020-12-15 NOTE — PROGRESS NOTES
DOT PHYSICAL    Guerow Dorcas 23 y.o. presents for a DOT physical.  He has the following concerns: none    No dizziness, syncope or fainting, exertional chest pain, excessive shortness of breath, focal weakness, abdominal pain, severe depressed mood, thoughts of suicide, recreational drug abuse, excessive alcohol usage. No excessive sleepiness in the day time falling asleep at the wheel or any motor vehicle accidents. No diabetes. No insulin or narcotic agents. Patient Active Problem List   Diagnosis Code    Right shoulder injury S49. 91XA    New daily persistent headache G44.52     Past Medical History:   Diagnosis Date    Right shoulder injury 8/18/2014    Seen at [address] [place] [address] 8.12.2014     No past surgical history on file. Social History     Socioeconomic History    Marital status: SINGLE     Spouse name: Not on file    Number of children: Not on file    Years of education: Not on file    Highest education level: Not on file   Occupational History    Not on file   Social Needs    Financial resource strain: Not on file    Food insecurity     Worry: Not on file     Inability: Not on file    Transportation needs     Medical: Not on file     Non-medical: Not on file   Tobacco Use    Smoking status: Former Smoker     Last attempt to quit: 12/11/2017     Years since quitting: 3.0    Smokeless tobacco: Never Used   Substance and Sexual Activity    Alcohol use:  Yes     Alcohol/week: 0.0 standard drinks     Comment: Occasional    Drug use: No    Sexual activity: Yes     Partners: Female     Birth control/protection: Condom   Lifestyle    Physical activity     Days per week: Not on file     Minutes per session: Not on file    Stress: Not on file   Relationships    Social connections     Talks on phone: Not on file     Gets together: Not on file     Attends Taoist service: Not on file     Active member of club or organization: Not on file     Attends meetings of clubs or organizations: Not on file     Relationship status: Not on file    Intimate partner violence     Fear of current or ex partner: Not on file     Emotionally abused: Not on file     Physically abused: Not on file     Forced sexual activity: Not on file   Other Topics Concern    Not on file   Social History Narrative    Not on file             Results for orders placed or performed in visit on 12/15/20   AMB POC URINALYSIS DIP STICK AUTO W/O MICRO   Result Value Ref Range    Color (UA POC) Yellow     Clarity (UA POC) Clear     Glucose (UA POC) Negative Negative    Bilirubin (UA POC) Negative Negative    Ketones (UA POC) Negative Negative    Specific gravity (UA POC) 1.030 1.001 - 1.035    Blood (UA POC) Trace Negative    pH (UA POC) 6.5 4.6 - 8.0    Protein (UA POC) Negative Negative    Urobilinogen (UA POC) 0.2 mg/dL 0.2 - 1    Nitrites (UA POC) Negative Negative    Leukocyte esterase (UA POC) Negative Negative         ROS: 12 point system revived,please see HPI for pertinent positives. OBJECTIVE:     Visit Vitals  /80 (BP 1 Location: Left arm, BP Patient Position: Sitting)   Pulse 68   Temp 97.6 °F (36.4 °C) (Temporal)   Resp 16   Ht 5' 8\" (1.727 m)   Wt 160 lb (72.6 kg)   SpO2 99%   BMI 24.33 kg/m²       Vision meet standards and hearing test good. Hearing Screening    125Hz 250Hz 500Hz 1000Hz 2000Hz 3000Hz 4000Hz 6000Hz 8000Hz   Right ear:            Left ear:            Comments: Hearing &gt;5 feet L/R ears     Visual Acuity Screening    Right eye Left eye Both eyes   Without correction: 20/20 20/20    With correction:             Physical Examination:    General appearance - alert, well appearing, and in no distress. HEENT  PERRLA, EOMI, Sclera clear, anicteric, Oropharynx clear, no lesions. Chest - RRR S1, S2 heard, no peripheral edema. Lungs- Clear to auscultation, no wheezes, rales or rhonchi, has symmetric air entry    Neck- Supple, No adenopathy.     Neuro- CN 2 to 11 grossly normal, No neuro deficits              DTR 2/4, strength on upper/lower ext 5/5 manny    Abdomen/groin- ND,NT, No hernia    ROM: Good ROM of upper and lower extremities. Feet normal      Psy- Mood and Affect WNL      ASSESSMENT/PLAN         Healthy appearing person. Advised routine care with PCP. 2 yr DOT card with no restrictions given. DOT papers kept in file and [facility] web site updated. I have discussed the diagnosis with the patient and the intended plan as seen in the above orders. The patient has received an after-visit summary and questions were answered concerning future plans. I have discussed medication side effects and warnings with the patient as well. Pt verbalizes understanding.

## 2020-12-15 NOTE — PROGRESS NOTES
Chief Complaint   Patient presents with    Physical     DOT     Health Maintenance reviewed. I have reviewed the patient's medical history in detail and updated the computerized patient record. Patient has not been out of the country in (8-9 months), NO diarrhea, NO cough, NO chest conjestion, NO temp. Pt has not been around anyone with these symptoms. 1. Have you been to the ER, urgent care clinic since your last visit? Hospitalized since your last visit?no    2. Have you seen or consulted any other health care providers outside of the [address] [place] [address] since your last visit? Include any pap smears or colon screening. No      Encouraged pt to discuss pt's wishes with spouse/partner/family and bring them in the next appt to follow thru with the Advanced Directive    Fall Risk Assessment, last 12 mths 12/15/2020   Able to walk? Yes   Fall in past 12 months? No       3 most recent PHQ Screens 12/15/2020   Little interest or pleasure in doing things Several days   Feeling down, depressed, irritable, or hopeless Several days   Total Score PHQ 2 2       Abuse Screening Questionnaire 12/15/2020   Do you ever feel afraid of your partner? N   Are you in a relationship with someone who physically or mentally threatens you? N   Is it safe for you to go home?  Y       ADL Assessment 12/15/2020   Feeding yourself No Help Needed   Getting from bed to chair No Help Needed   Getting dressed No Help Needed   Bathing or showering No Help Needed   Walk across the room (includes cane/walker) No Help Needed   Using the telphone No Help Needed   Taking your medications No Help Needed   Preparing meals No Help Needed   Managing money (expenses/bills) No Help Needed   Moderately strenuous housework (laundry) No Help Needed   Shopping for personal items (toiletries/medicines) No Help Needed   Shopping for groceries No Help Needed   Driving No Help Needed   Climbing a flight of stairs No Help Needed   Getting to places beyond walking distances No Help Needed

## 2022-03-20 PROBLEM — G44.52 NEW DAILY PERSISTENT HEADACHE: Status: ACTIVE | Noted: 2018-03-28

## 2022-10-05 ENCOUNTER — OFFICE VISIT (OUTPATIENT)
Dept: INTERNAL MEDICINE CLINIC | Age: 27
End: 2022-10-05
Payer: OTHER GOVERNMENT

## 2022-10-05 VITALS
BODY MASS INDEX: 23.79 KG/M2 | HEIGHT: 68 IN | TEMPERATURE: 98.3 F | RESPIRATION RATE: 16 BRPM | WEIGHT: 157 LBS | DIASTOLIC BLOOD PRESSURE: 60 MMHG | OXYGEN SATURATION: 98 % | HEART RATE: 61 BPM | SYSTOLIC BLOOD PRESSURE: 106 MMHG

## 2022-10-05 DIAGNOSIS — Z11.6 MALARIA SCREENING: ICD-10-CM

## 2022-10-05 DIAGNOSIS — N52.9 ERECTILE DYSFUNCTION, UNSPECIFIED ERECTILE DYSFUNCTION TYPE: ICD-10-CM

## 2022-10-05 DIAGNOSIS — H93.13 TINNITUS OF BOTH EARS: Primary | ICD-10-CM

## 2022-10-05 PROBLEM — G44.52 NEW DAILY PERSISTENT HEADACHE: Status: RESOLVED | Noted: 2018-03-28 | Resolved: 2022-10-05

## 2022-10-05 PROCEDURE — 99214 OFFICE O/P EST MOD 30 MIN: CPT | Performed by: FAMILY MEDICINE

## 2022-10-05 RX ORDER — SILDENAFIL 100 MG/1
100 TABLET, FILM COATED ORAL
Qty: 10 TABLET | Refills: 5 | Status: SHIPPED | OUTPATIENT
Start: 2022-10-05

## 2022-10-05 NOTE — PROGRESS NOTES
Chief Complaint   Patient presents with    Establish Care     Patient has not been out of the country in (14 months), NO diarrhea, NO cough, NO chest conjestion, NO temp. Pt has not been around anyone with these symptoms. Health Maintenance reviewed. I have reviewed the patient's medical history in detail and updated the computerized patient record. 1. Have you been to the ER, urgent care clinic since your last visit? No  Hospitalized since your last visit? No     2. Have you seen or consulted any other health care providers outside of the [address] [place] [address] since your last visit? No  Include any pap smears or colon screening. Encouraged pt to discuss pt's wishes with spouse/partner/family and bring them in the next appt to follow thru with the Advanced Directive    @  [address], last 12 mths 12/15/2020   Able to walk? Yes   Fall in past 12 months? No       3 most recent PHQ Screens 10/5/2022   Little interest or pleasure in doing things Several days   Feeling down, depressed, irritable, or hopeless Several days   Total Score PHQ 2 2       Abuse Screening Questionnaire 10/5/2022   Do you ever feel afraid of your partner? N   Are you in a relationship with someone who physically or mentally threatens you? N   Is it safe for you to go home?  Y       ADL Assessment 10/5/2022   Feeding yourself No Help Needed   Getting from bed to chair No Help Needed   Getting dressed No Help Needed   Bathing or showering No Help Needed   Walk across the room (includes cane/walker) No Help Needed   Using the telphone No Help Needed   Taking your medications No Help Needed   Preparing meals No Help Needed   Managing money (expenses/bills) No Help Needed   Moderately strenuous housework (laundry) No Help Needed   Shopping for personal items (toiletries/medicines) No Help Needed   Shopping for groceries No Help Needed   Driving No Help Needed   Climbing a flight of stairs No Help Needed   Getting to places beyond walking distances No Help Needed

## 2022-10-05 NOTE — PROGRESS NOTES
Melene Aschoff (: 1995) is a 24 y.o. male, established patient, here for evaluation of the following chief complaint(s):  Establish Care and Ringing in Ear       ASSESSMENT/PLAN:  Below is the assessment and plan developed based on review of pertinent history, physical exam, labs, studies, and medications. Use white noise masking of tinnitus. Reassurance that symptoms will probably resolve on their own, declined medications. Routine labs ordered    1. Tinnitus of both ears  2. Malaria screening  -     METABOLIC PANEL, COMPREHENSIVE; Future  -     CBC W/O DIFF; Future  3. Erectile dysfunction, unspecified erectile dysfunction type  -     sildenafil citrate (VIAGRA) 100 mg tablet; Take 1 Tablet by mouth daily as needed for Erectile Dysfunction. , Normal, Disp-10 Tablet, R-5      Return in about 2 months (around 2022) for routine follow up. 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 He has had labs and evaluation done in [place]. No results. Wants a more thorough evaluation. Relates some ringing in both ears. Some loud noise exposure. Has had some issues with erectile dysfunction since returning. Minimal complaints of depression.     Review of Systems  As above  No fever, weight loss or coughing    Patient Active Problem List   Diagnosis Code    Tinnitus of both ears H93.13     No medications  No Known Allergies    Social History     Socioeconomic History    Marital status: SINGLE     Spouse name: Not on file    Number of children: Not on file    Years of education: Not on file    Highest education level: Not on file   Occupational History    Not on file   Tobacco Use    Smoking status: Some Days     Types: Cigarettes     Last attempt to quit: 2017     Years since quittin.8     Passive exposure: Past    Smokeless tobacco: Never   Vaping Use    Vaping Use: Never used   Substance and Sexual Activity    Alcohol use: Yes     Alcohol/week: 0.0 standard drinks     Comment: Occasional    Drug use: No    Sexual activity: Yes     Partners: Female     Birth control/protection: Condom   Other Topics Concern    Not on file   Social History Narrative    Not on file     Social Determinants of Health     Financial Resource Strain: Not on file   Food Insecurity: Not on file   Transportation Needs: Not on file   Physical Activity: Sufficiently Active    Days of Exercise per Week: 5 days    Minutes of Exercise per Session: 60 min   Stress: Not on file   Social Connections: Not on file   Intimate Partner Violence: Not At Risk    Fear of Current or Ex-Partner: No    Emotionally Abused: No    Physically Abused: No    Sexually Abused: No   Housing Stability: Not on file       Physical Exam    Visit Vitals  /60 (BP 1 Location: Left arm, BP Patient Position: Sitting, BP Cuff Size: Adult)   Pulse 61   Temp 98.3 °F (36.8 °C) (Temporal)   Resp 16   Ht 5' 8\" (1.727 m)   Wt 157 lb (71.2 kg)   SpO2 98%   BMI 23.87 kg/m²       WDWN NAD  TM clear, throat wnl  Neck no adenopathy  Heart RRR no C/M/R  Lungs CTA  Abdo soft non tender  Ext No redness swelling or edema      An electronic signature was used to authenticate this note.   -- Sherlyn Napoles MD

## 2022-10-06 LAB
ALBUMIN SERPL-MCNC: 5.2 G/DL (ref 4.1–5.2)
ALBUMIN/GLOB SERPL: 1.9 {RATIO} (ref 1.2–2.2)
ALP SERPL-CCNC: 60 IU/L (ref 44–121)
ALT SERPL-CCNC: 14 IU/L (ref 0–44)
AST SERPL-CCNC: 21 IU/L (ref 0–40)
BILIRUB SERPL-MCNC: 0.8 MG/DL (ref 0–1.2)
BUN SERPL-MCNC: 19 MG/DL (ref 6–20)
BUN/CREAT SERPL: 18 (ref 9–20)
CALCIUM SERPL-MCNC: 9.8 MG/DL (ref 8.7–10.2)
CHLORIDE SERPL-SCNC: 101 MMOL/L (ref 96–106)
CO2 SERPL-SCNC: 23 MMOL/L (ref 20–29)
CREAT SERPL-MCNC: 1.07 MG/DL (ref 0.76–1.27)
EGFR: 99 ML/MIN/1.73
ERYTHROCYTE [DISTWIDTH] IN BLOOD BY AUTOMATED COUNT: 12.5 % (ref 11.6–15.4)
GLOBULIN SER CALC-MCNC: 2.8 G/DL (ref 1.5–4.5)
GLUCOSE SERPL-MCNC: 94 MG/DL (ref 70–99)
HCT VFR BLD AUTO: 45.3 % (ref 37.5–51)
HGB BLD-MCNC: 15.1 G/DL (ref 13–17.7)
MCH RBC QN AUTO: 29.9 PG (ref 26.6–33)
MCHC RBC AUTO-ENTMCNC: 33.3 G/DL (ref 31.5–35.7)
MCV RBC AUTO: 90 FL (ref 79–97)
PLATELET # BLD AUTO: 256 X10E3/UL (ref 150–450)
POTASSIUM SERPL-SCNC: 4.1 MMOL/L (ref 3.5–5.2)
PROT SERPL-MCNC: 8 G/DL (ref 6–8.5)
RBC # BLD AUTO: 5.05 X10E6/UL (ref 4.14–5.8)
SODIUM SERPL-SCNC: 140 MMOL/L (ref 134–144)
WBC # BLD AUTO: 4.1 X10E3/UL (ref 3.4–10.8)

## 2022-12-07 ENCOUNTER — DOCUMENTATION ONLY (OUTPATIENT)
Dept: INTERNAL MEDICINE CLINIC | Age: 27
End: 2022-12-07

## 2022-12-07 ENCOUNTER — OFFICE VISIT (OUTPATIENT)
Dept: INTERNAL MEDICINE CLINIC | Age: 27
End: 2022-12-07

## 2022-12-07 VITALS
RESPIRATION RATE: 16 BRPM | SYSTOLIC BLOOD PRESSURE: 110 MMHG | DIASTOLIC BLOOD PRESSURE: 61 MMHG | OXYGEN SATURATION: 98 % | BODY MASS INDEX: 23.49 KG/M2 | HEIGHT: 68 IN | WEIGHT: 155 LBS | TEMPERATURE: 98.6 F | HEART RATE: 67 BPM

## 2022-12-07 DIAGNOSIS — N52.9 ERECTILE DYSFUNCTION, UNSPECIFIED ERECTILE DYSFUNCTION TYPE: ICD-10-CM

## 2022-12-07 DIAGNOSIS — Z02.89 ENCOUNTER FOR OCCUPATIONAL PHYSICAL EXAMINATION: Primary | ICD-10-CM

## 2022-12-07 LAB
BILIRUB UR QL STRIP: NEGATIVE
GLUCOSE UR-MCNC: NEGATIVE MG/DL
KETONES P FAST UR STRIP-MCNC: NEGATIVE MG/DL
PH UR STRIP: 5.5 [PH] (ref 4.6–8)
PROT UR QL STRIP: NEGATIVE
SP GR UR STRIP: 1.02 (ref 1–1.03)
UA UROBILINOGEN AMB POC: NORMAL (ref 0.2–1)
URINALYSIS CLARITY POC: CLEAR
URINALYSIS COLOR POC: YELLOW
URINE BLOOD POC: NEGATIVE
URINE LEUKOCYTES POC: NORMAL
URINE NITRITES POC: NEGATIVE

## 2022-12-07 RX ORDER — SILDENAFIL 100 MG/1
100 TABLET, FILM COATED ORAL
Qty: 10 TABLET | Refills: 5 | Status: SHIPPED | OUTPATIENT
Start: 2022-12-07

## 2022-12-07 NOTE — PROGRESS NOTES
DOT PHYSICAL    Sydelle Corporal 24 y.o. presents for a DOT physical.  He has the following concerns: no c/o    No dizziness, syncope or fainting, exertional chest pain, excessive shortness of breath, focal weakness, abdominal pain, severe depressed mood, thoughts of suicide, recreational drug abuse, excessive alcohol usage. No excessive sleepiness in the day time falling asleep at the wheel or any motor vehicle accidents. No diabetes. No insulin or narcotic agents. Patient Active Problem List   Diagnosis Code    Tinnitus of both ears H93.13     Past Medical History:   Diagnosis Date    Right shoulder injury 2014    Seen at [address] [place] [address] 2014     History reviewed. No pertinent surgical history. Social History     Socioeconomic History    Marital status: SINGLE     Spouse name: Not on file    Number of children: Not on file    Years of education: Not on file    Highest education level: Not on file   Occupational History    Not on file   Tobacco Use    Smoking status: Some Days     Types: Cigarettes     Last attempt to quit: 2017     Years since quittin.9     Passive exposure: Past    Smokeless tobacco: Never   Vaping Use    Vaping Use: Never used   Substance and Sexual Activity    Alcohol use:  Yes     Alcohol/week: 0.0 standard drinks     Comment: Occasional    Drug use: No    Sexual activity: Yes     Partners: Female     Birth control/protection: Condom   Other Topics Concern    Not on file   Social History Narrative    Not on file     Social Determinants of Health     Financial Resource Strain: Not on file   Food Insecurity: Not on file   Transportation Needs: Not on file   Physical Activity: Sufficiently Active    Days of Exercise per Week: 5 days    Minutes of Exercise per Session: 60 min   Stress: Not on file   Social Connections: Not on file   Intimate Partner Violence: Not At Risk    Fear of Current or Ex-Partner: No    Emotionally Abused: No    Physically Abused: No    Sexually Abused: No   Housing Stability: Not on file       Current Outpatient Medications   Medication Sig Dispense Refill    sildenafil citrate (VIAGRA) 100 mg tablet Take 1 Tablet by mouth daily as needed for Erectile Dysfunction. 10 Tablet 5         Results for orders placed or performed in visit on 12/07/22   AMB POC URINALYSIS DIP STICK AUTO W/O MICRO   Result Value Ref Range    Color (UA POC) Yellow     Clarity (UA POC) Clear     Glucose (UA POC) Negative Negative    Bilirubin (UA POC) Negative Negative    Ketones (UA POC) Negative Negative    Specific gravity (UA POC) 1.025 1.001 - 1.035    Blood (UA POC) Negative Negative    pH (UA POC) 5.5 4.6 - 8.0    Protein (UA POC) Negative Negative    Urobilinogen (UA POC) 0.2 mg/dL 0.2 - 1    Nitrites (UA POC) Negative Negative    Leukocyte esterase (UA POC) Trace Negative         ROS: 12 point system revived,please see HPI for pertinent positives. OBJECTIVE:   Visit Vitals  /61 (BP 1 Location: Left arm, BP Patient Position: Sitting, BP Cuff Size: Adult)   Pulse 67   Temp 98.6 °F (37 °C) (Temporal)   Resp 16   Ht 5' 8\" (1.727 m)   Wt 155 lb (70.3 kg)   SpO2 98%   BMI 23.57 kg/m²       Vision meet standards and hearing test good. Vision Screening    Right eye Left eye Both eyes   Without correction 20/20 20/20 20/20   With correction      Hearing Screening - Comments[de-identified] Hearing L/R &gt; 5 feet        Physical Examination:    General appearance - alert, well appearing, and in no distress. HEENT  PERRLA, EOMI, Sclera clear, anicteric, Oropharynx clear, no lesions. Chest - RRR S1, S2 heard, no peripheral edema. Lungs- Clear to auscultation, no wheezes, rales or rhonchi, has symmetric air entry    Neck- Supple, No adenopathy. Neuro- CN 2 to 11 grossly normal, No neuro deficits              DTR 2/4, strength on upper/lower ext 5/5 manny    Abdomen/groin- ND,NT, No hernia    ROM: Good ROM of upper and lower extremities. Feet normal      Psy- Mood and Affect WNL      ASSESSMENT/PLAN       ICD-10-CM ICD-9-CM    1. Encounter for occupational physical examination  Z02.89 V70.5 AMB POC URINALYSIS DIP STICK AUTO W/O MICRO      2. Erectile dysfunction, unspecified erectile dysfunction type  N52.9 607.84 sildenafil citrate (VIAGRA) 100 mg tablet        Orders Placed This Encounter    AMB POC URINALYSIS DIP STICK AUTO W/O MICRO    sildenafil citrate (VIAGRA) 100 mg tablet     Sig: Take 1 Tablet by mouth daily as needed for Erectile Dysfunction. Dispense:  10 Tablet     Refill:  5         Healthy appearing person. Advised routine care with PCP. 2 yr DOT card with no restrictions given. DOT papers kept in file and [facility] web site updated. I have discussed the diagnosis with the patient and the intended plan as seen in the above orders. The patient has received an after-visit summary and questions were answered concerning future plans. I have discussed medication side effects and warnings with the patient as well. Pt verbalizes understanding.     F/up prn

## 2022-12-07 NOTE — PROGRESS NOTES
Key: [id number] - PA Case ID: [id number] - Rx #: [id number]  Outcome  Approved today. [facility] pharmacy called approval,Licha stated he picked up script today with cp pay of $2.46 FOR 6 DAY SUPPLY. XAFAUS:[id number];WATPXC:[id number]; Review Type:Prior Auth; Coverage Start Date:11/07/2022; Coverage End Date:12/31/2099;  Drug  Sildenafil Citrate 100MG tablets  Form   Electronic PA Form (9925 NCPDP)

## 2022-12-07 NOTE — PROGRESS NOTES
Key: [id number] - PA Case ID: [id number] - Rx #: [id number]  Status  Sent to Plan today. Decision will be faxed to office within 24 to 72 hours. Please follow up as needed.   Drug  Sildenafil Citrate 100MG tablets  Form   Electronic PA Form (1315 NCPDP)

## 2023-01-17 DIAGNOSIS — N52.9 ERECTILE DYSFUNCTION, UNSPECIFIED ERECTILE DYSFUNCTION TYPE: ICD-10-CM

## 2023-01-19 RX ORDER — SILDENAFIL 100 MG/1
100 TABLET, FILM COATED ORAL
Qty: 10 TABLET | Refills: 5 | Status: SHIPPED | OUTPATIENT
Start: 2023-01-19

## 2023-02-10 ENCOUNTER — OFFICE VISIT (OUTPATIENT)
Dept: INTERNAL MEDICINE CLINIC | Age: 28
End: 2023-02-10
Payer: OTHER GOVERNMENT

## 2023-02-10 VITALS
OXYGEN SATURATION: 98 % | HEART RATE: 87 BPM | BODY MASS INDEX: 23.19 KG/M2 | TEMPERATURE: 98.6 F | RESPIRATION RATE: 16 BRPM | DIASTOLIC BLOOD PRESSURE: 74 MMHG | SYSTOLIC BLOOD PRESSURE: 125 MMHG | HEIGHT: 68 IN | WEIGHT: 153 LBS

## 2023-02-10 DIAGNOSIS — Z11.3 SCREEN FOR STD (SEXUALLY TRANSMITTED DISEASE): Primary | ICD-10-CM

## 2023-02-10 DIAGNOSIS — F10.10 ETOH ABUSE: ICD-10-CM

## 2023-02-10 NOTE — PROGRESS NOTES
Patient has not been out of the country in (14 months), NO diarrhea, NO cough, NO chest conjestion, NO temp. Pt has not been around anyone with these symptoms. Health Maintenance reviewed. I have reviewed the patient's medical history in detail and updated the computerized patient record. 1. \"Have you been to the ER, urgent care clinic since your last visit? No Hospitalized since your last visit? \" No     2. \"Have you seen or consulted any other health care providers outside of the [address] [place] [address] [place] since your last visit? \"  no    3. For patients aged 39-70: Has the patient had a colonoscopy / FIT/ Cologuard? no      Encouraged pt to discuss pt's wishes with spouse/partner/family and bring them in the next appt to follow thru with the Advanced Directive    @  Fall Risk Assessment, last 12 mths 12/15/2020   Able to walk? Yes   Fall in past 12 months? No       3 most recent PHQ Screens 12/7/2022   Little interest or pleasure in doing things Several days   Feeling down, depressed, irritable, or hopeless Several days   Total Score PHQ 2 2       Abuse Screening Questionnaire 12/7/2022   Do you ever feel afraid of your partner? N   Are you in a relationship with someone who physically or mentally threatens you? N   Is it safe for you to go home?  Y       ADL Assessment 12/7/2022   Feeding yourself No Help Needed   Getting from bed to chair No Help Needed   Getting dressed No Help Needed   Bathing or showering No Help Needed   Walk across the room (includes cane/walker) No Help Needed   Using the telphone No Help Needed   Taking your medications No Help Needed   Preparing meals No Help Needed   Managing money (expenses/bills) No Help Needed   Moderately strenuous housework (laundry) No Help Needed   Shopping for personal items (toiletries/medicines) No Help Needed   Shopping for groceries No Help Needed   Driving No Help Needed   Climbing a flight of stairs No Help Needed   Getting to places beyond walking distances No Help Needed

## 2023-02-10 NOTE — PROGRESS NOTES
Tiesha Chaudhary (: 1995) is a 34 y.o. male is here for evaluation of the following chief complaint(s): Exposure to STD and Depression        Subjective/Objective:   Sammy Katz was seen today for Exposure to STD and Depression  Pt here to be screened for STD since sexual partner was positive. Pt denies any symptoms. Pt also reports having a DUI 2 days ago. Pt has not been to court yet. Pt states I need counseling and therapy. Behavioral Health ordered. Pt reports drinking 6 pk beer a day. Review of Systems   Constitutional: Negative. HENT: Negative. Eyes: Negative. Respiratory: Negative. Cardiovascular: Negative. Gastrointestinal: Negative. Genitourinary: Negative. Musculoskeletal: Negative. Skin: Negative. Neurological: Negative. Endo/Heme/Allergies: Negative. Psychiatric/Behavioral:  Positive for substance abuse. Negative for depression, hallucinations, memory loss and suicidal ideas. The patient is nervous/anxious. The patient does not have insomnia. Physical Exam  Vitals reviewed. Constitutional:       General: He is not in acute distress. Appearance: He is not ill-appearing or toxic-appearing. HENT:      Head: Normocephalic and atraumatic. Right Ear: External ear normal.      Left Ear: External ear normal.      Nose: Nose normal.      Mouth/Throat:      Mouth: Mucous membranes are moist.   Eyes:      Conjunctiva/sclera: Conjunctivae normal.      Pupils: Pupils are equal, round, and reactive to light. Cardiovascular:      Rate and Rhythm: Normal rate and regular rhythm. Pulses: Normal pulses. Heart sounds: Normal heart sounds. Pulmonary:      Effort: Pulmonary effort is normal.      Breath sounds: Normal breath sounds. Abdominal:      General: There is no distension. Palpations: Abdomen is soft. Tenderness: There is no abdominal tenderness. There is no guarding. Musculoskeletal:         General: Normal range of motion. Cervical back: Normal range of motion. Right lower leg: No edema. Left lower leg: No edema. Skin:     General: Skin is warm and dry. Capillary Refill: Capillary refill takes less than 2 seconds. Neurological:      General: No focal deficit present. Mental Status: He is alert and oriented to person, place, and time. Psychiatric:         Attention and Perception: Attention and perception normal.         Mood and Affect: Mood is anxious. Speech: Speech normal.         Behavior: Behavior is agitated. Behavior is cooperative. Thought Content: Thought content normal.         Cognition and Memory: Cognition and memory normal.         Judgment: Judgment is impulsive. /74 (BP 1 Location: Left arm, BP Patient Position: Sitting, BP Cuff Size: Adult)   Pulse 87   Temp 98.6 °F (37 °C) (Temporal)   Resp 16   Ht 5' 8\" (1.727 m)   Wt 153 lb (69.4 kg)   SpO2 98%   BMI 23.26 kg/m²            History reviewed. No pertinent surgical history. Past Medical History:   Diagnosis Date    Right shoulder injury 8/18/2014    Seen at [address] [place] [address] 8.12.2014        Current Outpatient Medications   Medication Instructions    sildenafil citrate (VIAGRA) 100 mg, Oral, DAILY AS NEEDED        Assessment/Plan:     The above diagnosis is a acute on chronic problem. We discussed expected course, resolution, and complications of diagnosis in detail. I advised him to call back or return to office if symptoms worsen/change/persist.        ICD-10-CM ICD-9-CM    1. Screen for STD (sexually transmitted disease)  Z11.3 V74.5 REFERRAL TO BEHAVIORAL HEALTH      HIV 1/2 AG/AB, 4TH GENERATION,W RFLX CONFIRM      RPR      CHLAMYDIA / GC-AMPLIFIED      HIV 1/2 AG/AB, 4TH GENERATION,W RFLX CONFIRM      URINALYSIS W/ RFLX MICROSCOPIC      URINALYSIS W/ RFLX MICROSCOPIC      2. ETOH abuse  F10.10 305.00          Return in about 3 days (around 2/13/2023).     An electronic signature was used to authenticate this note.   -- Kevin Sofia, NP

## 2023-02-12 NOTE — PROGRESS NOTES
Adelso Izaguirre (: 1995) is a 34 y.o. male, established patient, here for evaluation of the following chief complaint(s):  No chief complaint on file. See other note    An electronic signature was used to authenticate this note.   -- Benny Piña MD

## 2023-02-13 ENCOUNTER — OFFICE VISIT (OUTPATIENT)
Dept: INTERNAL MEDICINE CLINIC | Age: 28
End: 2023-02-13
Payer: OTHER GOVERNMENT

## 2023-02-13 VITALS
RESPIRATION RATE: 16 BRPM | HEART RATE: 65 BPM | HEIGHT: 68 IN | TEMPERATURE: 98.5 F | WEIGHT: 153 LBS | DIASTOLIC BLOOD PRESSURE: 60 MMHG | SYSTOLIC BLOOD PRESSURE: 104 MMHG | BODY MASS INDEX: 23.19 KG/M2 | OXYGEN SATURATION: 98 %

## 2023-02-13 DIAGNOSIS — F41.9 ANXIETY: Primary | ICD-10-CM

## 2023-02-13 DIAGNOSIS — F10.11 ALCOHOL ABUSE, IN REMISSION: ICD-10-CM

## 2023-02-13 DIAGNOSIS — Z11.59 ENCOUNTER FOR HEPATITIS C SCREENING TEST FOR LOW RISK PATIENT: ICD-10-CM

## 2023-02-13 PROCEDURE — 99214 OFFICE O/P EST MOD 30 MIN: CPT | Performed by: FAMILY MEDICINE

## 2023-02-13 RX ORDER — CITALOPRAM 10 MG/1
10 TABLET ORAL DAILY
Qty: 30 TABLET | Refills: 2 | Status: SHIPPED | OUTPATIENT
Start: 2023-02-13

## 2023-02-13 NOTE — PROGRESS NOTES
Subjective:     Nicole Gómez is a 34 y.o. male who presents for follow up of   Patient Active Problem List   Diagnosis Code    Tinnitus of both ears H93.13   anxiety    New concerns: started drinking to help with anxiety but got DUI, GF was unfaithful. Marj Ramos has stopped etoh. Still in [employer]  See BINU    Psychiatric ROS:   Reports has never taking psychiatric medications. No complaints of abnormal movements, suicidal ideation or focal weakness. Review of Systems, additional:  Pertinent items are noted in HPI. Patient Active Problem List    Diagnosis Date Noted    Tinnitus of both ears 10/05/2022     No Known Allergies  Past Medical History:   Diagnosis Date    Right shoulder injury 2014    Seen at [address] [place] [address] 2014     Social History     Tobacco Use    Smoking status: Some Days     Types: Cigarettes     Last attempt to quit: 2017     Years since quittin.1     Passive exposure: Past    Smokeless tobacco: Never   Substance Use Topics    Alcohol use: Not Currently     Comment: Occasional                 Objective:     Physical exam significant for the following:     Visit Vitals  /60 (BP 1 Location: Left arm, BP Patient Position: Sitting, BP Cuff Size: Adult)   Pulse 65   Temp 98.5 °F (36.9 °C) (Temporal)   Resp 16   Ht 5' 8\" (1.727 m)   Wt 153 lb (69.4 kg)   SpO2 98%   BMI 23.26 kg/m²       No acute distress  2-12 grossly non focal  Affect a little flat    . Assessment/Plan:     Finish w/up for STIs as below  Disc ETOH usage. ICD-10-CM ICD-9-CM    1. Anxiety  F41.9 300.00 citalopram (CELEXA) 10 mg tablet      2. Encounter for hepatitis C screening test for low risk patient  Z11.59 V73.89 HEP B SURFACE AG      HEPATITIS C AB, RFLX TO QT BY PCR      3. Alcohol abuse, in remission  F10.11 305.03         Discussed possible side affects, precautions, and drug interactions and possible benefits of the medication(s).       Orders Placed This Encounter    HEP B SURFACE AG Standing Status:   Future     Standing Expiration Date:   8/14/2023    HEPATITIS C AB, RFLX TO QT BY PCR     Standing Status:   Future     Standing Expiration Date:   8/13/2023    citalopram (CELEXA) 10 mg tablet     Sig: Take 1 Tablet by mouth daily. Dispense:  30 Tablet     Refill:  2       Take half a tab the first few days then increase as tolerated. Discussed possible side affects, precautions, and drug interactions and possible benefits of the medication(s). Follow-up and Dispositions    Return in about 6 weeks (around 3/27/2023) for routine follow up.

## 2023-02-13 NOTE — PROGRESS NOTES
Chief Complaint   Patient presents with    Labs     Lab results     Patient has not been out of the country in (14 months), NO diarrhea, NO cough, NO chest conjestion, NO temp. Pt has not been around anyone with these symptoms. Health Maintenance reviewed. I have reviewed the patient's medical history in detail and updated the computerized patient record. 1. \"Have you been to the ER, urgent care clinic since your last visit? no Hospitalized since your last visit? \" no    2. \"Have you seen or consulted any other health care providers outside of the [address] [place] [address] since your last visit? \" no     3. For patients aged 39-70: Has the patient had a colonoscopy / FIT/ Cologuard? no    Encouraged pt to discuss pt's wishes with spouse/partner/family and bring them in the next appt to follow thru with the Advanced Directive    @  Fall Risk Assessment, last 12 mths 12/15/2020   Able to walk? Yes   Fall in past 12 months? No       3 most recent PHQ Screens 2/10/2023   Little interest or pleasure in doing things Nearly every day   Feeling down, depressed, irritable, or hopeless Nearly every day   Total Score PHQ 2 6   Trouble falling or staying asleep, or sleeping too much Nearly every day   Feeling tired or having little energy Nearly every day   Poor appetite, weight loss, or overeating More than half the days   Feeling bad about yourself - or that you are a failure or have let yourself or your family down Several days   Trouble concentrating on things such as school, work, reading, or watching TV Nearly every day   Moving or speaking so slowly that other people could have noticed; or the opposite being so fidgety that others notice Not at all   Thoughts of being better off dead, or hurting yourself in some way Not at all   PHQ 9 Score 18       Abuse Screening Questionnaire 2/10/2023   Do you ever feel afraid of your partner?  N   Are you in a relationship with someone who physically or mentally threatens you? N   Is it safe for you to go home?  Y       ADL Assessment 2/10/2023   Feeding yourself No Help Needed   Getting from bed to chair No Help Needed   Getting dressed No Help Needed   Bathing or showering No Help Needed   Walk across the room (includes cane/walker) No Help Needed   Using the telphone No Help Needed   Taking your medications No Help Needed   Preparing meals No Help Needed   Managing money (expenses/bills) No Help Needed   Moderately strenuous housework (laundry) No Help Needed   Shopping for personal items (toiletries/medicines) No Help Needed   Shopping for groceries No Help Needed   Driving No Help Needed   Climbing a flight of stairs No Help Needed   Getting to places beyond walking distances No Help Needed

## 2023-02-14 LAB
HBV SURFACE AG SERPL QL IA: NEGATIVE
HCV AB SERPL QL IA: NORMAL
HCV IGG SERPL QL IA: NON REACTIVE

## 2023-03-26 NOTE — PROGRESS NOTES
Subjective:     Nicole Gómez is a 34 y.o. male who presents for follow up of   Depression. Patient Active Problem List   Diagnosis Code    Tinnitus of both ears H93.13       New concerns: mood  up and down seeing therapist? Bipolar. Not Dx. he is read up on the symptoms though does say that his behavior fits that description. Super hi or low mood wise fatigue. Stretches when I don't need sleep. Easily agitated, not suicidal.  Symptoms no better with citalopram may be a little worse  Minimal complaints of sadness and is no longer drinking any alcohol. No other controlled substance usage. Psychiatric ROS:   Reports taking psychiatric medications with some dry mouth. No complaints of abnormal movements, suicidal ideation or focal weakness. Review of Systems, additional:  Pertinent items are noted in HPI. Patient Active Problem List    Diagnosis Date Noted    Tinnitus of both ears 10/05/2022     No Known Allergies  Past Medical History:   Diagnosis Date    Right shoulder injury 2014    Seen at [address] [place] [address] 8     Social History     Tobacco Use    Smoking status: Some Days     Types: Cigarettes     Last attempt to quit: 2017     Years since quittin.2     Passive exposure: Past    Smokeless tobacco: Never   Substance Use Topics    Alcohol use: Not Currently     Comment: Occasional                Objective:     Physical exam significant for the following:     Visit Vitals  /61 (BP 1 Location: Left arm, BP Patient Position: Sitting, BP Cuff Size: Adult)   Pulse 86   Temp 98.6 °F (37 °C) (Temporal)   Resp 16   Ht 5' 8\" (1.727 m)   Wt 151 lb (68.5 kg)   SpO2 98%   BMI 22.96 kg/m²         No acute distress not visibly agitated  2-12 grossly non focal  Affect normal    .   Assessment/Plan:       ICD-10-CM ICD-9-CM    1. Bipolar 2 disorder (HCC)  F31.81 296.89       2.  Anxiety  F41.9 300.00         Orders Placed This Encounter    divalproex DR (DEPAKOTE) 125 mg tablet Sig: Take 1 Tablet by mouth three (3) times daily. Start 1 tablet daily, usually in the evening, every few days increase as tolerated, mood. Dispense:  90 Tablet     Refill:  2     Discussed possible side affects, precautions, and drug interactions and possible benefits of the medication(s). Stop citalopram    Current Outpatient Medications   Medication Sig Dispense Refill    divalproex DR (DEPAKOTE) 125 mg tablet Take 1 Tablet by mouth three (3) times daily. Start 1 tablet daily, usually in the evening, every few days increase as tolerated, mood. 90 Tablet 2    sildenafil citrate (VIAGRA) 100 mg tablet Take 1 Tablet by mouth daily as needed for Erectile Dysfunction. 10 Tablet 5     F/up 1mo.

## 2023-03-27 ENCOUNTER — OFFICE VISIT (OUTPATIENT)
Dept: INTERNAL MEDICINE CLINIC | Age: 28
End: 2023-03-27
Payer: OTHER GOVERNMENT

## 2023-03-27 VITALS
OXYGEN SATURATION: 98 % | HEIGHT: 68 IN | RESPIRATION RATE: 16 BRPM | DIASTOLIC BLOOD PRESSURE: 61 MMHG | HEART RATE: 86 BPM | SYSTOLIC BLOOD PRESSURE: 113 MMHG | TEMPERATURE: 98.6 F | BODY MASS INDEX: 22.88 KG/M2 | WEIGHT: 151 LBS

## 2023-03-27 DIAGNOSIS — F31.81 BIPOLAR 2 DISORDER (HCC): Primary | ICD-10-CM

## 2023-03-27 DIAGNOSIS — F41.9 ANXIETY: ICD-10-CM

## 2023-03-27 PROCEDURE — 99214 OFFICE O/P EST MOD 30 MIN: CPT | Performed by: FAMILY MEDICINE

## 2023-03-27 RX ORDER — DIVALPROEX SODIUM 125 MG/1
125 TABLET, DELAYED RELEASE ORAL 3 TIMES DAILY
Qty: 90 TABLET | Refills: 2 | Status: SHIPPED | OUTPATIENT
Start: 2023-03-27

## 2023-03-27 NOTE — PROGRESS NOTES
Chief Complaint   Patient presents with    Anxiety     Med evaluation     Patient has not been out of the country in (14 months), NO diarrhea, NO cough, NO chest conjestion, NO temp. Pt has not been around anyone with these symptoms. Health Maintenance reviewed. I have reviewed the patient's medical history in detail and updated the computerized patient record. 1. \"Have you been to the ER, urgent care clinic since your last visit? No  Hospitalized since your last visit? \" no    2. \"Have you seen or consulted any other health care providers outside of the [address] [place] [address] since your last visit? \"  no    3. For patients aged 39-70: Has the patient had a colonoscopy / FIT/ Cologuard? no      If the patient is female:    4. For patients aged 41-77: Has the patient had a mammogram within the past 2 years? no      5. For patients aged 21-65: Has the patient had a pap smear?  no    Encouraged pt to discuss pt's wishes with spouse/partner/family and bring them in the next appt to follow thru with the Advanced Directive    @  Fall Risk Assessment, last 12 mths 12/15/2020   Able to walk? Yes   Fall in past 12 months?  No       3 most recent PHQ Screens 3/27/2023   Little interest or pleasure in doing things Nearly every day   Feeling down, depressed, irritable, or hopeless Nearly every day   Total Score PHQ 2 6   Trouble falling or staying asleep, or sleeping too much Nearly every day   Feeling tired or having little energy Nearly every day   Poor appetite, weight loss, or overeating Several days   Feeling bad about yourself - or that you are a failure or have let yourself or your family down Not at all   Trouble concentrating on things such as school, work, reading, or watching TV Not at all   Moving or speaking so slowly that other people could have noticed; or the opposite being so fidgety that others notice Not at all   Thoughts of being better off dead, or hurting yourself in some way -   PHQ 9 Score - How difficult have these problems made it for you to do your work, take care of your home and get along with others Very difficult       Abuse Screening Questionnaire 2/10/2023   Do you ever feel afraid of your partner? N   Are you in a relationship with someone who physically or mentally threatens you? N   Is it safe for you to go home?  Y       ADL Assessment 3/27/2023   Feeding yourself No Help Needed   Getting from bed to chair No Help Needed   Getting dressed No Help Needed   Bathing or showering No Help Needed   Walk across the room (includes cane/walker) No Help Needed   Using the telphone No Help Needed   Taking your medications No Help Needed   Preparing meals No Help Needed   Managing money (expenses/bills) No Help Needed   Moderately strenuous housework (laundry) No Help Needed   Shopping for personal items (toiletries/medicines) No Help Needed   Shopping for groceries No Help Needed   Driving No Help Needed   Climbing a flight of stairs No Help Needed   Getting to places beyond walking distances No Help Needed

## 2023-05-03 ENCOUNTER — OFFICE VISIT (OUTPATIENT)
Dept: INTERNAL MEDICINE CLINIC | Age: 28
End: 2023-05-03

## 2023-05-03 VITALS
TEMPERATURE: 98.6 F | HEIGHT: 68 IN | HEART RATE: 62 BPM | BODY MASS INDEX: 22.88 KG/M2 | RESPIRATION RATE: 16 BRPM | WEIGHT: 151 LBS | OXYGEN SATURATION: 99 % | SYSTOLIC BLOOD PRESSURE: 105 MMHG | DIASTOLIC BLOOD PRESSURE: 60 MMHG

## 2023-05-03 DIAGNOSIS — F33.1 DEPRESSION, MAJOR, RECURRENT, MODERATE (HCC): ICD-10-CM

## 2023-05-03 DIAGNOSIS — F43.10 PTSD (POST-TRAUMATIC STRESS DISORDER): Primary | ICD-10-CM

## 2023-05-03 DIAGNOSIS — N52.9 ERECTILE DYSFUNCTION, UNSPECIFIED ERECTILE DYSFUNCTION TYPE: ICD-10-CM

## 2023-05-03 RX ORDER — FLUOXETINE 20 MG/1
TABLET ORAL
Qty: 30 TABLET | Refills: 2 | Status: SHIPPED | OUTPATIENT
Start: 2023-05-03

## 2023-05-04 RX ORDER — SILDENAFIL 100 MG/1
100 TABLET, FILM COATED ORAL
Qty: 10 TABLET | Refills: 5 | Status: SHIPPED | OUTPATIENT
Start: 2023-05-04

## 2024-03-12 ENCOUNTER — OFFICE VISIT (OUTPATIENT)
Facility: CLINIC | Age: 29
End: 2024-03-12
Payer: COMMERCIAL

## 2024-03-12 VITALS
OXYGEN SATURATION: 98 % | HEART RATE: 60 BPM | HEIGHT: 68 IN | SYSTOLIC BLOOD PRESSURE: 139 MMHG | TEMPERATURE: 97.9 F | WEIGHT: 168 LBS | BODY MASS INDEX: 25.46 KG/M2 | DIASTOLIC BLOOD PRESSURE: 75 MMHG | RESPIRATION RATE: 16 BRPM

## 2024-03-12 DIAGNOSIS — Z00.00 WELLNESS EXAMINATION: Primary | ICD-10-CM

## 2024-03-12 DIAGNOSIS — Z23 NEED FOR VACCINATION: ICD-10-CM

## 2024-03-12 DIAGNOSIS — Z11.3 ROUTINE SCREENING FOR STI (SEXUALLY TRANSMITTED INFECTION): ICD-10-CM

## 2024-03-12 DIAGNOSIS — Z23 NEEDS FLU SHOT: ICD-10-CM

## 2024-03-12 PROCEDURE — 90472 IMMUNIZATION ADMIN EACH ADD: CPT | Performed by: FAMILY MEDICINE

## 2024-03-12 PROCEDURE — 90677 PCV20 VACCINE IM: CPT | Performed by: FAMILY MEDICINE

## 2024-03-12 PROCEDURE — 90674 CCIIV4 VAC NO PRSV 0.5 ML IM: CPT | Performed by: FAMILY MEDICINE

## 2024-03-12 PROCEDURE — 90471 IMMUNIZATION ADMIN: CPT | Performed by: FAMILY MEDICINE

## 2024-03-12 PROCEDURE — 99395 PREV VISIT EST AGE 18-39: CPT | Performed by: FAMILY MEDICINE

## 2024-03-12 RX ORDER — SILDENAFIL 100 MG/1
100 TABLET, FILM COATED ORAL DAILY PRN
Qty: 30 TABLET | Refills: 5 | Status: SHIPPED | OUTPATIENT
Start: 2024-03-12

## 2024-03-12 SDOH — ECONOMIC STABILITY: INCOME INSECURITY: HOW HARD IS IT FOR YOU TO PAY FOR THE VERY BASICS LIKE FOOD, HOUSING, MEDICAL CARE, AND HEATING?: NOT HARD AT ALL

## 2024-03-12 SDOH — ECONOMIC STABILITY: HOUSING INSECURITY
IN THE LAST 12 MONTHS, WAS THERE A TIME WHEN YOU DID NOT HAVE A STEADY PLACE TO SLEEP OR SLEPT IN A SHELTER (INCLUDING NOW)?: NO

## 2024-03-12 SDOH — ECONOMIC STABILITY: FOOD INSECURITY: WITHIN THE PAST 12 MONTHS, YOU WORRIED THAT YOUR FOOD WOULD RUN OUT BEFORE YOU GOT MONEY TO BUY MORE.: NEVER TRUE

## 2024-03-12 SDOH — ECONOMIC STABILITY: FOOD INSECURITY: WITHIN THE PAST 12 MONTHS, THE FOOD YOU BOUGHT JUST DIDN'T LAST AND YOU DIDN'T HAVE MONEY TO GET MORE.: NEVER TRUE

## 2024-03-12 ASSESSMENT — PATIENT HEALTH QUESTIONNAIRE - PHQ9
SUM OF ALL RESPONSES TO PHQ QUESTIONS 1-9: 2
1. LITTLE INTEREST OR PLEASURE IN DOING THINGS: 1
10. IF YOU CHECKED OFF ANY PROBLEMS, HOW DIFFICULT HAVE THESE PROBLEMS MADE IT FOR YOU TO DO YOUR WORK, TAKE CARE OF THINGS AT HOME, OR GET ALONG WITH OTHER PEOPLE: 0
2. FEELING DOWN, DEPRESSED OR HOPELESS: 1
SUM OF ALL RESPONSES TO PHQ QUESTIONS 1-9: 2
SUM OF ALL RESPONSES TO PHQ9 QUESTIONS 1 & 2: 2
1. LITTLE INTEREST OR PLEASURE IN DOING THINGS: 1
SUM OF ALL RESPONSES TO PHQ9 QUESTIONS 1 & 2: 2
SUM OF ALL RESPONSES TO PHQ QUESTIONS 1-9: 2
2. FEELING DOWN, DEPRESSED OR HOPELESS: 1

## 2024-03-12 ASSESSMENT — ANXIETY QUESTIONNAIRES
2. NOT BEING ABLE TO STOP OR CONTROL WORRYING: 1
1. FEELING NERVOUS, ANXIOUS, OR ON EDGE: 1
IF YOU CHECKED OFF ANY PROBLEMS ON THIS QUESTIONNAIRE, HOW DIFFICULT HAVE THESE PROBLEMS MADE IT FOR YOU TO DO YOUR WORK, TAKE CARE OF THINGS AT HOME, OR GET ALONG WITH OTHER PEOPLE: NOT DIFFICULT AT ALL

## 2024-03-12 NOTE — PROGRESS NOTES
Chief Complaint   Patient presents with    Wellness Program     physical     Patient has not been out of the country in (14 months), NO diarrhea, NO cough, NO chest conjestion, NO temp.  Pt has not been around anyone with these symptoms.     Health Maintenance reviewed.    I have reviewed the patient's medical history in detail and updated the computerized patient record.    \"Have you been to the ER, urgent care clinic since your last visit? No  Hospitalized since your last visit?\"    no    “Have you seen or consulted any other health care providers outside of [facility] since your last visit?”    no                                       
Encounter    C.Trachomatis N.Gonorrhoeae Amplification     Standing Status:   Future     Number of Occurrences:   1     Standing Expiration Date:   3/12/2025    Pneumococcal, PCV20, PREVNAR 20, (age 6w+), IM, PF    Influenza, FLUCELVAX, (age 6 mo+), IM, Preservative Free, 0.5 mL    sildenafil (VIAGRA) 100 MG tablet     Sig: Take 1 tablet by mouth daily as needed for Erectile Dysfunction     Dispense:  30 tablet     Refill:  5    HPV 9-valent recomb vaccine (GARDASIL) ZULEYKA injection     Sig: Inject 0.5 mLs into the muscle every 3 months for 3 doses     Dispense:  0.5 each     Refill:  2       Current Outpatient Medications   Medication Sig Dispense Refill    sildenafil (VIAGRA) 100 MG tablet Take 1 tablet by mouth daily as needed for Erectile Dysfunction 30 tablet 5    HPV 9-valent recomb vaccine (GARDASIL) ZULEYKA injection Inject 0.5 mLs into the muscle every 3 months for 3 doses 0.5 each 2     No current facility-administered medications for this visit.           I have discussed the diagnosis with the patient and the intended plan as seen in the above orders. The patient understands and agrees with the plan. The patient has received an after-visit summary and questions were answered concerning future plans.     Medication Side Effects and Warnings were discussed with patien  Patient Labs were reviewed and or requested  Patient Past Records were reviewed and or requested    See patient instructions, went over them personally with the patient. Emphasized compliance. Questions answered.Patient states that they understand the plan of action and will call if there are any issues or misunderstandings.    As needed    Gerardo Christianson MD [facility]

## 2024-03-13 DIAGNOSIS — Z11.3 ROUTINE SCREENING FOR STI (SEXUALLY TRANSMITTED INFECTION): ICD-10-CM

## 2024-03-13 LAB
COMMENT:: NORMAL
SPECIMEN HOLD: NORMAL

## 2024-03-16 LAB
C TRACH RRNA SPEC QL NAA+PROBE: NEGATIVE
N GONORRHOEA RRNA SPEC QL NAA+PROBE: NEGATIVE
SPECIMEN SOURCE: NORMAL

## 2024-11-11 ENCOUNTER — OFFICE VISIT (OUTPATIENT)
Facility: CLINIC | Age: 29
End: 2024-11-11

## 2024-11-11 VITALS
RESPIRATION RATE: 16 BRPM | DIASTOLIC BLOOD PRESSURE: 77 MMHG | SYSTOLIC BLOOD PRESSURE: 127 MMHG | OXYGEN SATURATION: 97 % | WEIGHT: 174 LBS | HEIGHT: 68 IN | HEART RATE: 51 BPM | BODY MASS INDEX: 26.37 KG/M2 | TEMPERATURE: 98.1 F

## 2024-11-11 DIAGNOSIS — Z02.89 ENCOUNTER FOR OCCUPATIONAL PHYSICAL EXAMINATION: Primary | ICD-10-CM

## 2024-11-11 LAB
BILIRUBIN, URINE, POC: NEGATIVE
BLOOD URINE, POC: NEGATIVE
GLUCOSE URINE, POC: NEGATIVE
KETONES, URINE, POC: NEGATIVE
LEUKOCYTE ESTERASE, URINE, POC: NEGATIVE
NITRITE, URINE, POC: NEGATIVE
PH, URINE, POC: 5 (ref 4.6–8)
PROTEIN,URINE, POC: NEGATIVE
SPECIFIC GRAVITY, URINE, POC: 1.01 (ref 1–1.03)
URINALYSIS CLARITY, POC: CLEAR
URINALYSIS COLOR, POC: YELLOW
UROBILINOGEN, POC: NORMAL

## 2024-11-11 NOTE — PROGRESS NOTES
Chief Complaint   Patient presents with    Employment Physical     DOT     Patient has not been out of the country in (14 months), NO diarrhea, NO cough, NO chest conjestion, NO temp.  Pt has not been around anyone with these symptoms.     Health Maintenance reviewed.    I have reviewed the patient's medical history in detail and updated the computerized patient record.    \"Have you been to the ER, urgent care clinic since your last visit? no Hospitalized since your last visit?\"    no    “Have you seen or consulted any other health care providers outside of [facility] since your last visit?”    no                                       
entry    Neck- Supple, No adenopathy.    Neuro- CN 2 to 11 grossly normal, No neuro deficits              DTR 2/4, strength on upper/lower ext 5/5 alysia    Abdomen/groin- ND,NT, No hernia    ROM: Good ROM of upper and lower extremities.    Feet normal      Psy- Mood and Affect WNL    ASSESSMENT/PLAN      Diagnosis Orders   1. Encounter for occupational physical examination  AMB POC URINALYSIS DIP STICK AUTO W/O MICRO        Orders Placed This Encounter    AMB POC URINALYSIS DIP STICK AUTO W/O MICRO              Healthy appearing person.  Advised routine care with PCP.    2 yr DOT card with no restrictions given.    DOT papers kept in file and [facility] web site updated.     I have discussed the diagnosis with the patient and the intended plan as seen in the above orders.  The patient has received an after-visit summary and questions were answered concerning future plans.  I have discussed medication side effects and warnings with the patient as well. Pt verbalizes understanding.    The patient (or guardian, if applicable) and other individuals in attendance with the patient were advised that Artificial Intelligence will be utilized during this visit to record and process the conversation to generate a clinical note. The patient (or guardian, if applicable) and other individuals in attendance at the appointment consented to the use of AI, including the recording.      An electronic signature was used to authenticate this note.    --Gerardo Christianson MD